# Patient Record
Sex: MALE | Race: WHITE | NOT HISPANIC OR LATINO | Employment: OTHER | URBAN - METROPOLITAN AREA
[De-identification: names, ages, dates, MRNs, and addresses within clinical notes are randomized per-mention and may not be internally consistent; named-entity substitution may affect disease eponyms.]

---

## 2017-03-07 ENCOUNTER — OFFICE VISIT (OUTPATIENT)
Dept: NEUROLOGY | Facility: CLINIC | Age: 67
End: 2017-03-07
Payer: MEDICARE

## 2017-03-07 VITALS
BODY MASS INDEX: 31.94 KG/M2 | HEIGHT: 67 IN | WEIGHT: 203.5 LBS | HEART RATE: 76 BPM | SYSTOLIC BLOOD PRESSURE: 148 MMHG | DIASTOLIC BLOOD PRESSURE: 79 MMHG

## 2017-03-07 DIAGNOSIS — G20.A1 PARKINSON DISEASE: Primary | ICD-10-CM

## 2017-03-07 DIAGNOSIS — E55.9 VITAMIN D DEFICIENCY: ICD-10-CM

## 2017-03-07 DIAGNOSIS — G47.9 SLEEP DISORDER: ICD-10-CM

## 2017-03-07 DIAGNOSIS — R25.1 TREMOR: ICD-10-CM

## 2017-03-07 PROCEDURE — 99214 OFFICE O/P EST MOD 30 MIN: CPT | Mod: S$PBB,,, | Performed by: PSYCHIATRY & NEUROLOGY

## 2017-03-07 PROCEDURE — 99999 PR PBB SHADOW E&M-EST. PATIENT-LVL III: CPT | Mod: PBBFAC,,, | Performed by: PSYCHIATRY & NEUROLOGY

## 2017-03-07 PROCEDURE — 99213 OFFICE O/P EST LOW 20 MIN: CPT | Mod: PBBFAC | Performed by: PSYCHIATRY & NEUROLOGY

## 2017-03-07 RX ORDER — RASAGILINE 1 MG/1
1 TABLET ORAL DAILY
Qty: 90 TABLET | Refills: 4 | Status: SHIPPED | OUTPATIENT
Start: 2017-03-07 | End: 2017-09-12

## 2017-03-07 NOTE — MR AVS SNAPSHOT
[facility]  [address]  [facility] [address]  Phone: [phone]  Fax: [phone]                  Luiz Mar   3/7/2017 2:20 PM   Office Visit    Description:  Male : 1950   Provider:  Tina Jaramillo MD   Department:  [facility]           Diagnoses this Visit        Comments    Tremor    -  Primary     Sleep disorder         Vitamin D deficiency                To Do List           Goals (5 Years of Data)     None      Follow-Up and Disposition     Return in about 3 months (around 2017).       These Medications        Disp Refills Start End    rasagiline (AZILECT) 1 mg Tab 90 tablet 4 3/7/2017 3/7/2018    Take 1 tablet (1 mg total) by mouth once daily. - Oral    Pharmacy: [facility] [address] #: [phone]         [facility]     [facility] On Call Nurse Care Line -  Assistance  Registered nurses in the [facility] On Call Center provide clinical advisement, health education, appointment booking, and other advisory services.  Call for this free service at [phone].             Medications           Message regarding Medications     Verify the changes and/or additions to your medication regime listed below are the same as discussed with your clinician today.  If any of these changes or additions are incorrect, please notify your healthcare provider.        START taking these NEW medications        Refills    rasagiline (AZILECT) 1 mg Tab 4    Sig: Take 1 tablet (1 mg total) by mouth once daily.    Class: Normal    Route: Oral           Verify that the below list of medications is an accurate representation of the medications you are currently taking.  If none reported, the list may be blank. If incorrect, please contact your healthcare provider. Carry this list with you in case of emergency.           Current Medications     aspirin (ECOTRIN) 81 MG EC tablet Take 81 mg by mouth once daily.     "BENICAR 20 mg tablet     rasagiline (AZILECT) 1 mg Tab Take 1 tablet (1 mg total) by mouth once daily.           Clinical Reference Information           Your Vitals Were     BP Pulse Height Weight BMI    148/79 76 5' 7" (1.702 m) 92.3 kg (203 lb 7.8 oz) 31.87 kg/m2      Blood Pressure          Most Recent Value    BP  (!)  148/79      Allergies as of 3/7/2017     No Known Allergies      Immunizations Administered on Date of Encounter - 3/7/2017     None      [facility] Sign-Up     Activating your [facility] account is as easy as 1-2-3!     1) Visit [email or web address], select Sign Up Now, enter this activation code and your date of birth, then select Next.  [email or web address]  Expires: 4/21/2017  3:06 PM      2) Create a username and password to use when you visit [facility] in the future and select a security question in case you lose your password and select Next.    3) Enter your e-mail address and click Sign Up!    Additional Information  If you have questions, please e-mail [email or web address] or call [phone] to talk to our [facility] staff. Remember, [facility] is NOT to be used for urgent needs. For medical emergencies, dial 911.         Instructions        Lab Visit on 11/29/2016   Component Date Value Ref Range Status    PTH, Intact 11/29/2016 86.0* 9.0 - 77.0 pg/mL Final    Calcium 11/29/2016 9.4  8.7 - 10.5 mg/dL Final    Thiamine 11/29/2016 41  38 - 122 ug/L Final    Comment: This test was developed and the performance   characteristics determined by [facility].   It has not been cleared or approved by the FDA.   The laboratory is regulated under CLIA as qualified to   perform high-complexity testing. This test is used for   patient testing purposes. It should not be regarded   as investigational or for research.  Test performed at [facility],  [address], [place]  [address]     [phone]  Fadi Esposito MD  - Medical Director      Vit D, 25-Hydroxy 11/29/2016 " 26* 30 - 96 ng/mL Final    Comment: Vitamin D deficiency.........<10 ng/mL                              Vitamin D insufficiency......10-29 ng/mL       Vitamin D sufficiency........> or equal to 30 ng/mL  Vitamin D toxicity............>100 ng/mL              Language Assistance Services     ATTENTION: Language assistance services are available, free of charge. Please call [phone].      ATENCIÓN: Si misti johansen, tiene a eng disposición servicios gratuitos de asistencia lingüística. Llame al [phone].     [facility] Ý: N?u b?n nói Ti?ng Vi?t, có các d?ch v? h? tr? ngôn ng? mi?n phí dành cho b?n. G?i s? [phone].         [facility] complies with applicable Federal civil rights laws and does not discriminate on the basis of race, color, national origin, age, disability, or sex.

## 2017-03-07 NOTE — ASSESSMENT & PLAN NOTE
Mild progression in tremor in past 3 months, worsening dexterity of right hand.     -> start azilect   -> OT

## 2017-03-07 NOTE — PATIENT INSTRUCTIONS
Lab Visit on 11/29/2016   Component Date Value Ref Range Status    PTH, Intact 11/29/2016 86.0* 9.0 - 77.0 pg/mL Final    Calcium 11/29/2016 9.4  8.7 - 10.5 mg/dL Final    Thiamine 11/29/2016 41  38 - 122 ug/L Final    Comment: This test was developed and the performance   characteristics determined by [facility].   It has not been cleared or approved by the FDA.   The laboratory is regulated under CLIA as qualified to   perform high-complexity testing. This test is used for   patient testing purposes. It should not be regarded   as investigational or for research.  Test performed at [facility],  [address] [place]  [address]     [phone]  Fadi Esposito MD  - Medical Director      Vit D, 25-Hydroxy 11/29/2016 26* 30 - 96 ng/mL Final    Comment: Vitamin D deficiency.........<10 ng/mL                              Vitamin D insufficiency......10-29 ng/mL       Vitamin D sufficiency........> or equal to 30 ng/mL  Vitamin D toxicity............>100 ng/mL

## 2017-03-07 NOTE — PROGRESS NOTES
"Luiz BRICENO Chief Complaints during this visit:  f/u Patient visit for  tremors      Primary Care Physician  Elias Westfall MD  [address]  [facility] [address]      History of present illness:   66 y.o.  male seen in f/u for PD.  Accompanied by wife.  Tremor is more obvious at times since last visit.  Active dreaming is less often.      Interval history 11/29/16:  No worsening since last seen.  Balance is still "ok."  Still dreaming actively about 2x/week.  Wife says he does hit at times.      Interval history 8/31/16:  ...consultation at the request of  Dr. Sharma (friend of daughter) for evaluation of tremor.  Accompanied wife and daughter.  Right hand tremor for about 6 months.  Bystolic started last fall.  Not long after (couple months), he started fighting in sleep.  Still talks, but not nearly as violent.  Notices more often when walking.  No problems with gait, balance.  Sense of smell is poor per wife.  Wife says that he asks questions more often.    Retired oil-rig contractor.  2015.  Paternal aunt penny had seizures and head tremor.   No etoh use.      II.  Review of systems  As in HPI,  otherwise, balance 3 systems reviewed and are negative.    III.  Past Medical History:   Diagnosis Date    Heart disease     right bundle brach block    Memory loss 8/31/2016    Movement disorder     tremors (in hand)     No family history on file.  Social History     Social History    Marital status:      Spouse name: N/A    Number of children: N/A    Years of education: N/A     Social History Main Topics    Smoking status: Never Smoker    Smokeless tobacco: None    Alcohol use No    Drug use: No    Sexual activity: Yes     Partners: Female     Other Topics Concern    None     Social History Narrative         Current Outpatient Prescriptions on File Prior to Visit   Medication Sig Dispense Refill    aspirin (ECOTRIN) 81 MG EC tablet Take 81 mg by mouth once daily.      BENICAR 20 mg tablet    " "    No current facility-administered medications on file prior to visit.        PRIOR problem-specific medications tried:  na    Review of patient's allergies indicates:  No Known Allergies    IV. Physical Exam    Vitals:    03/07/17 1428   BP: (!) 148/79   Pulse: 76   Weight: 92.3 kg (203 lb 7.8 oz)   Height: 5' 7" (1.702 m)     General appearance: Well nourished, well developed, no acute distress.          -------------------------------------------------------------  Facial Expression: 2: Mild: In addition to decreased eye-blink frequency, Masked facies present in the lower  face as well, namely fewer movements around the mouth, such as less  spontaneous smiling, but lips not parted.      Affect: full       Orientation to time & place:  Oriented to time, place, person and situation       Speech:  normal (not dysarthric)  -------------------------------------------------------  Cranial nerves:  extraocular movements intact,         -------------------------------------------------------  Cerebellar and Coordination  Gait:  normal, resting tremor on right       Finger-nose: no dysmetria       Rapid Alternating Movements (pronation/supination):  R 2: Mild: Any of the following: a) 3 to 5 interruptions during movements; b) mild slowing; c) the amplitude decrements midway in the sequence. ; L normal  --------------------------------------------------------------  MOVEMENT DISORDERS FOCUSED EXAM  Abnormality of movement (bradykinesia, hyperkinesia) present? Yes, 1: Slight: Slight global slowness and poverty of spontaneous movements.   Tremor present?   Yes, 1: Slight: Tremor is present but less than 1 cm in amplitude. 1: Slight: Tremor at rest is present < 25% of the entire examination period.   Posture:  normal  Postural stability:  no Rhomberg    V.  Laboratory/ Radiological Data:     No visits with results within 2 Month(s) from this visit.  Latest known visit with results is:    Lab Visit on 11/29/2016   Component " Date Value Ref Range Status    PTH, Intact 11/29/2016 86.0* 9.0 - 77.0 pg/mL Final    Calcium 11/29/2016 9.4  8.7 - 10.5 mg/dL Final    Thiamine 11/29/2016 41  38 - 122 ug/L Final    Comment: This test was developed and the performance   characteristics determined by [facility].   It has not been cleared or approved by the FDA.   The laboratory is regulated under CLIA as qualified to   perform high-complexity testing. This test is used for   patient testing purposes. It should not be regarded   as investigational or for research.  Test performed at [facility],  [address], [place]  [address]     [phone]  Fadi Esposito MD  - Medical Director      Vit D, 25-Hydroxy 11/29/2016 26* 30 - 96 ng/mL Final    Comment: Vitamin D deficiency.........<10 ng/mL                              Vitamin D insufficiency......10-29 ng/mL       Vitamin D sufficiency........> or equal to 30 ng/mL  Vitamin D toxicity............>100 ng/mL           Outpatient labs:  Wbc unremarkable; CMP unremarkable except mildly elevated glucose.  TSH 1.170    Vitamin D, thiamine, PTH not drawn      9/8/16 MRI brain personally reviewed and found it normal.                     VI. Assessment and Plan:    Problem List Items Addressed This Visit     Vitamin D deficiency    Overview     11/29/16:  Vit D 26L         Current Assessment & Plan     Start multivitamin         Parkinson disease - Primary    Overview     Right hand resting tremor, mild bradykinesia, RBD         Current Assessment & Plan     Mild progression in tremor in past 3 months, worsening dexterity of right hand.     -> start azilect   -> OT         Relevant Medications    rasagiline (AZILECT) 1 mg Tab    Sleep disorder    Overview     Active dreaming           Current Assessment & Plan     Improved, less active dreaming events.   -> continue melatonin                                          Return in about 3 months (around 6/7/2017).  (me or  Melissa Altamirano

## 2017-03-07 NOTE — LETTER
March 7, 2017      Elias Westfall MD  [address]  [place] [address]           [facility]  [address]  [facility] [address]  Phone: [phone]  Fax: [phone]          Patient: Luiz Mar   MR Number: [id number]   YOB: 1950   Date of Visit: 3/7/2017       Dear Dr. Elias Westfall:    Thank you for referring Luiz Mar to me for evaluation. Attached you will find relevant portions of my assessment and plan of care.    If you have questions, please do not hesitate to call me. I look forward to following Luiz Mar along with you.    Sincerely,    Tina Jaramillo MD    Enclosure  CC:  No Recipients    If you would like to receive this communication electronically, please contact [email or web address] or [phone] to request more information on [facility] access.    For providers and/or their staff who would like to refer a patient to [facility], please contact us through our one-stop-shop provider referral line, [facility], at [phone].    If you feel you have received this communication in error or would no longer like to receive these types of communications, please e-mail [email or web address]

## 2017-09-12 ENCOUNTER — TELEPHONE (OUTPATIENT)
Dept: NEUROLOGY | Facility: CLINIC | Age: 67
End: 2017-09-12

## 2017-09-12 RX ORDER — SELEGILINE HYDROCHLORIDE 5 MG/1
5 TABLET ORAL 2 TIMES DAILY WITH MEALS
Qty: 60 TABLET | Refills: 11 | Status: SHIPPED | OUTPATIENT
Start: 2017-09-12 | End: 2018-08-15 | Stop reason: SDUPTHER

## 2017-09-12 NOTE — TELEPHONE ENCOUNTER
----- Message from Jose Duran sent at 9/12/2017 11:19 AM CDT -----  Contact: Erin ( spouse ) [phone] or [phone]  Caller is requesting a return call regarding a alternative to the medication (rasagiline (AZILECT) 1 mg Tab ) that is less expensive, pls call

## 2017-09-15 ENCOUNTER — TELEPHONE (OUTPATIENT)
Dept: NEUROLOGY | Facility: CLINIC | Age: 67
End: 2017-09-15

## 2017-09-15 NOTE — TELEPHONE ENCOUNTER
----- Message from Alex Tracey sent at 9/15/2017 11:30 AM CDT -----  Contact: Erin (wife) @ home: [phone] or cell: [phone]  Erin states she called before to speak with someone about get another medication other than azilect, because the azilect is too expensive. Erin is requesting someone contact her back asap.

## 2017-09-15 NOTE — TELEPHONE ENCOUNTER
Ms. Erin informed that a new script for selegiline had been sent by Dr. Jaramillo on Tuesday(9/12). Script was filled yesterday per pharm rep. It is available for pick-up now.

## 2018-03-27 ENCOUNTER — OFFICE VISIT (OUTPATIENT)
Dept: NEUROLOGY | Facility: CLINIC | Age: 68
End: 2018-03-27
Payer: MEDICARE

## 2018-03-27 VITALS
DIASTOLIC BLOOD PRESSURE: 88 MMHG | BODY MASS INDEX: 31.18 KG/M2 | HEIGHT: 67 IN | HEART RATE: 78 BPM | WEIGHT: 198.63 LBS | SYSTOLIC BLOOD PRESSURE: 158 MMHG

## 2018-03-27 DIAGNOSIS — R41.89 SUBJECTIVE MEMORY COMPLAINTS: ICD-10-CM

## 2018-03-27 DIAGNOSIS — G25.9 EXTRAPYRAMIDAL AND MOVEMENT DISORDER: ICD-10-CM

## 2018-03-27 DIAGNOSIS — G20.A1 PARKINSON DISEASE: Primary | ICD-10-CM

## 2018-03-27 PROCEDURE — 99999 PR PBB SHADOW E&M-EST. PATIENT-LVL III: CPT | Mod: PBBFAC,,, | Performed by: PSYCHIATRY & NEUROLOGY

## 2018-03-27 PROCEDURE — 99214 OFFICE O/P EST MOD 30 MIN: CPT | Mod: S$PBB,,, | Performed by: PSYCHIATRY & NEUROLOGY

## 2018-03-27 PROCEDURE — 99213 OFFICE O/P EST LOW 20 MIN: CPT | Mod: PBBFAC | Performed by: PSYCHIATRY & NEUROLOGY

## 2018-03-27 RX ORDER — AMOXICILLIN 500 MG
CAPSULE ORAL DAILY
COMMUNITY

## 2018-03-27 RX ORDER — SAW PALMETTO 160 MG
160 CAPSULE ORAL 2 TIMES DAILY
COMMUNITY

## 2018-03-27 RX ORDER — THIAMINE HCL 50 MG
50 TABLET ORAL DAILY
COMMUNITY

## 2018-03-27 RX ORDER — ERGOCALCIFEROL 1.25 MG/1
50000 CAPSULE ORAL
COMMUNITY

## 2018-03-27 NOTE — ASSESSMENT & PLAN NOTE
Mild, expected progression since last year.  Balance is becoming effected, but still mild.   -> PT   -> no new meds

## 2018-03-27 NOTE — ASSESSMENT & PLAN NOTE
Passed MOCA today without problems.  Will recheck his thiamine (was low end of normal 2 years ago), b12 and tsh for reversible causes.   -> labs today

## 2018-03-27 NOTE — PROGRESS NOTES
"Luiz BRICENO Chief Complaints during this visit:  f/u Patient visit for PD    Primary Care Physician  Elias Westfall MD  [address]  [facility] [address]      History of present illness:   67 y.o.  M seen in f/u for PD.  Right -handed, travels from [facility].  Accompanied by daughter, Miracle.  Tremors are more pronounced in right hand.  Walk is less steady, trips, no falls.    Memory is getting worse.  Stays busy working in yard, training his dogs.  Hobbies- hunting ducks, deer, dove  Rare active dreaming.      Interval history 3/7/17:  Tremor is more obvious at times since last visit.  Active dreaming is less often.    Interval history 11/29/16:  No worsening since last seen.  Balance is still "ok."  Still dreaming actively about 2x/week.  Wife says he does hit at times.    Interval history 8/31/16:  ...consultation at the request of  Dr. Sharma (friend of daughter) for evaluation of tremor.  Accompanied wife and daughter.  Right hand tremor for about 6 months.  Bystolic started last fall.  Not long after (couple months), he started fighting in sleep.  Still talks, but not nearly as violent.  Notices more often when walking.  No problems with gait, balance.  Sense of smell is poor per wife.  Wife says that he asks questions more often.    Retired oil-rig contractor.  2015.  Paternal aunt penny had seizures and head tremor.   No etoh use.      II.  Review of systems  As in HPI,  otherwise, balance 3 systems reviewed and are negative.    III.  Past Medical History:   Diagnosis Date    Heart disease     right bundle brach block    Memory loss 8/31/2016    Movement disorder     tremors (in hand)     No family history on file.  Social History     Social History    Marital status:      Spouse name: N/A    Number of children: N/A    Years of education: N/A     Social History Main Topics    Smoking status: Never Smoker    Smokeless tobacco: None    Alcohol use No    Drug use: No    Sexual " "activity: Yes     Partners: Female     Other Topics Concern    None     Social History Narrative    None         Current Outpatient Prescriptions on File Prior to Visit   Medication Sig Dispense Refill    aspirin (ECOTRIN) 81 MG EC tablet Take 81 mg by mouth once daily.      BENICAR 20 mg tablet       selegiline HCl (ELDEPRYL) 5 mg tablet Take 1 tablet (5 mg total) by mouth 2 (two) times daily with meals. REPLACES AZILECT, DUE TO COST. 60 tablet 11     No current facility-administered medications on file prior to visit.        PRIOR problem-specific medications tried:  na    Review of patient's allergies indicates:  No Known Allergies    IV. Physical Exam    Vitals:    03/27/18 0843   BP: (!) 158/88   Pulse: 78   Weight: 90.1 kg (198 lb 10.2 oz)   Height: 5' 7" (1.702 m)     General appearance: Well nourished, well developed, no acute distress.         Cardiovascular:  pedal pulses 2, no edema or cyanosis, heart regular rate and rhythym, no carotid bruits.         -------------------------------------------------------------   Affect: full       Orientation to time & place:  Oriented to time, place, person and situation       Attention & concentration:  Normal attention span and concentration       Memory:  Recent and remote memory intact  Zheng Cognitive Assessment:   28/30  Language: Spontaneous, fluent; able to repeat and name objects        Fund of knowledge:  Aware of current events            Unified Parkinson's Disease Rating Scale (motor part only)      UPDRS Motor Examination      Speech  1 - Slight loss of expression, dictation, and/or volumn.   Facial Expression  1 - Minimal Hypomimia, could be normal "Poker Face".   Rigidity     Neck 0 - Absent.   Upper Extremity: Right 2 - Mild or moderate.   Upper Extremity: Left 0 - Absent.   Lower Extremity: Right 0 - Absent.   Lower Extremity: Left 0 - Absent.     Finger Taps      right 1 - Mild slowing and/or reduction in amplitude.   left 0 - Normal. "   Hand Movements      right 2 - Moderately impaired. Definite and early fatiguing. May have occasional arrests in movement.   left 0 - Normal.   Pronation/supination of Hands      right 2 - Moderately impaired. Definite and early fatiguing. May have occasional arrests in movement.   left 0 - Normal.     Toe Tapping    right 0 - Normal.   left 0 - Normal.     Leg Agility      right 0 - Normal.   left 0 - Normal.   Arising from Chair  0 - Normal.   Posture  0 - Normal erect.   Gait  0 - Normal, but dec armswing on right   Freezing of gait 0: Normal: No freezing.    Postural Stability (Response to sudden, strong posterior displacement produced by pull on shoulders while patient erect with eyes open and feet slightly apart.   0 - Normal.   Body Bradykinesia and Hypokinesia (Combining slowness, hesitancy, decreased armswing, small amplitude, and poverty of movement in general)  2 - Mild degree of slowness and poverty of movement which is definitely abnormal.     Tremor at Rest:      Face, lips, chin 0 - Absent.    Hands:      right 2 - Mild in amplitude and persistent. Or moderate in amplitude, but only intermittently present.    left 0 - Absent.    Feet:     right 0 - Absent.    left 0 - Absent.    Constancy of REST tremor: 1: Slight: Tremor at rest is present < 25% of the entire examination period.  (only when walking)   Postural tremor:    right 0 - Absent.    left 0 - Absent.    Kinetic tremor:    right 0 - Absent.    left 0 - Absent.    Dyskinesias present? No       Total Motor UPDRS:  14      V.  Laboratory/ Radiological Data:     No visits with results within 2 Month(s) from this visit.   Latest known visit with results is:   Lab Visit on 11/29/2016   Component Date Value Ref Range Status    PTH, Intact 11/29/2016 86.0* 9.0 - 77.0 pg/mL Final    Calcium 11/29/2016 9.4  8.7 - 10.5 mg/dL Final    Thiamine 11/29/2016 41  38 - 122 ug/L Final    Comment: This test was developed and the performance   characteristics  determined by [facility].   It has not been cleared or approved by the FDA.   The laboratory is regulated under CLIA as qualified to   perform high-complexity testing. This test is used for   patient testing purposes. It should not be regarded   as investigational or for research.  Test performed at [facility],  [address], [place]  [address]     [phone]  Fadi Esposito MD  - Medical Director      Vit D, 25-Hydroxy 11/29/2016 26* 30 - 96 ng/mL Final    Comment: Vitamin D deficiency.........<10 ng/mL                              Vitamin D insufficiency......10-29 ng/mL       Vitamin D sufficiency........> or equal to 30 ng/mL  Vitamin D toxicity............>100 ng/mL           Outpatient labs:  Wbc unremarkable; CMP unremarkable except mildly elevated glucose.  TSH 1.170    Vitamin D, thiamine, PTH not drawn      9/8/16 MRI brain personally reviewed and found it normal.                     VI. Assessment and Plan:    Problem List Items Addressed This Visit        1 - High    Parkinson disease - Primary    Overview     Right hand resting tremor, mild bradykinesia, RBD         Current Assessment & Plan     Mild, expected progression since last year.  Balance is becoming effected, but still mild.   -> PT   -> no new meds         Relevant Orders    Ambulatory Referral to Physical/Occupational Therapy       2     Subjective memory complaints    Overview     Forgets names.  3/2018 Bentley Cognitive Assessment:   28/30         Current Assessment & Plan     Passed MOCA today without problems.  Will recheck his thiamine (was low end of normal 2 years ago), b12 and tsh for reversible causes.   -> labs today         Relevant Orders    TSH    Vitamin B12    Vitamin B1    Comprehensive metabolic panel    CBC auto differential      Other Visit Diagnoses     Extrapyramidal and movement disorder         Relevant Orders    TSH    Vitamin B12                                     Follow-up  in about 3 months (around 6/27/2018) for PD.

## 2018-08-15 ENCOUNTER — OFFICE VISIT (OUTPATIENT)
Dept: NEUROLOGY | Facility: CLINIC | Age: 68
End: 2018-08-15
Payer: MEDICARE

## 2018-08-15 VITALS
BODY MASS INDEX: 32.8 KG/M2 | HEIGHT: 67 IN | SYSTOLIC BLOOD PRESSURE: 137 MMHG | HEART RATE: 80 BPM | DIASTOLIC BLOOD PRESSURE: 83 MMHG | WEIGHT: 209 LBS

## 2018-08-15 DIAGNOSIS — G20.A1 PARKINSON DISEASE: Primary | ICD-10-CM

## 2018-08-15 PROCEDURE — 99213 OFFICE O/P EST LOW 20 MIN: CPT | Mod: S$PBB,,, | Performed by: PSYCHIATRY & NEUROLOGY

## 2018-08-15 PROCEDURE — 99999 PR PBB SHADOW E&M-EST. PATIENT-LVL III: CPT | Mod: PBBFAC,,, | Performed by: PSYCHIATRY & NEUROLOGY

## 2018-08-15 PROCEDURE — 99213 OFFICE O/P EST LOW 20 MIN: CPT | Mod: PBBFAC | Performed by: PSYCHIATRY & NEUROLOGY

## 2018-08-15 RX ORDER — SELEGILINE HYDROCHLORIDE 5 MG/1
5 TABLET ORAL 2 TIMES DAILY WITH MEALS
Qty: 60 TABLET | Refills: 11 | Status: SHIPPED | OUTPATIENT
Start: 2018-08-15 | End: 2019-05-22 | Stop reason: SDUPTHER

## 2018-08-15 NOTE — PROGRESS NOTES
"Luiz BRICENO Chief Complaints during this visit:  f/u Patient visit for PD    Primary Care Physician  Elias Westfall MD  [address]  [facility] [address]      History of present illness:   67 y.o.  M seen in f/u for PD.  Right -handed, travels from [facility].  Says theres is a little more tremor in right hand, worse with stress.  Memory about the same.  Less dreaming.    PT was beneficial.  No falls, but trips often.      Interval history 3/27/18:  Accompanied by daughter, Miracle.  Tremors are more pronounced in right hand.  Walk is less steady, trips, no falls.  Memory is getting worse.  Stays busy working in yard, training his dogs.  Hobbies- hunting ducks, deer, dove  Rare active dreaming.    Interval history 3/7/17:  Tremor is more obvious at times since last visit.  Active dreaming is less often.    Interval history 11/29/16:  No worsening since last seen.  Balance is still "ok."  Still dreaming actively about 2x/week.  Wife says he does hit at times.    Interval history 8/31/16:  ...consultation at the request of  Dr. Sharma (friend of daughter) for evaluation of tremor.  Accompanied wife and daughter.  Right hand tremor for about 6 months.  Bystolic started last fall.  Not long after (couple months), he started fighting in sleep.  Still talks, but not nearly as violent.  Notices more often when walking.  No problems with gait, balance.  Sense of smell is poor per wife.  Wife says that he asks questions more often.    Retired oil-rig contractor.  2015.  Paternal aunt penny had seizures and head tremor.   No etoh use.      II.  Review of systems  As in HPI,  otherwise, balance 3 systems reviewed and are negative.    III.  Past Medical History:   Diagnosis Date    Heart disease     right bundle brach block    Memory loss 8/31/2016    Parkinson disease 8/31/2016    Right hand resting tremor, mild bradykinesia, RBD     History reviewed. No pertinent family history.  Social History     Socioeconomic " "History    Marital status:      Spouse name: None    Number of children: None    Years of education: None    Highest education level: None   Social Needs    Financial resource strain: None    Food insecurity - worry: None    Food insecurity - inability: None    Transportation needs - medical: None    Transportation needs - non-medical: None   Occupational History    None   Tobacco Use    Smoking status: Never Smoker   Substance and Sexual Activity    Alcohol use: No    Drug use: No    Sexual activity: Yes     Partners: Female   Other Topics Concern    None   Social History Narrative    None         Current Outpatient Medications on File Prior to Visit   Medication Sig Dispense Refill    aspirin (ECOTRIN) 81 MG EC tablet Take 81 mg by mouth once daily.      BENICAR 20 mg tablet       ergocalciferol (VITAMIN D2) 50,000 unit Cap Take 50,000 Units by mouth every 7 days.      fish oil-omega-3 fatty acids 300-1,000 mg capsule Take by mouth once daily.      saw palmetto 160 MG capsule Take 160 mg by mouth 2 (two) times daily.      thiamine (VITAMIN B-1) 50 MG tablet Take 50 mg by mouth once daily.      [DISCONTINUED] selegiline HCl (ELDEPRYL) 5 mg tablet Take 1 tablet (5 mg total) by mouth 2 (two) times daily with meals. REPLACES AZILECT, DUE TO COST. 60 tablet 11     No current facility-administered medications on file prior to visit.        PRIOR problem-specific medications tried:  na    Review of patient's allergies indicates:  No Known Allergies    IV. Physical Exam    Vitals:    08/15/18 1118   BP: 137/83   Pulse: 80   Weight: 94.8 kg (209 lb)   Height: 5' 7" (1.702 m)     General appearance: Well nourished, well developed, no acute distress.         Cardiovascular:  pedal pulses 2, no edema or cyanosis, heart regular rate and rhythym, no carotid bruits.         -------------------------------------------------------------   Affect: full       Orientation to time & place:  Oriented to " "time, place, person and situation       Attention & concentration:  Normal attention span and concentration       Memory:  Recent and remote memory intact  Arp Cognitive Assessment:   28/30  Language: Spontaneous, fluent; able to repeat and name objects        Fund of knowledge:  Aware of current events            Unified Parkinson's Disease Rating Scale (motor part only)      UPDRS Motor Examination      Speech  1 - Slight loss of expression, dictation, and/or volumn.   Facial Expression  1 - Minimal Hypomimia, could be normal "Poker Face".   Rigidity     Neck 0 - Absent.   Upper Extremity: Right 2 - Mild or moderate.   Upper Extremity: Left 0 - Absent.   Lower Extremity: Right 0 - Absent.   Lower Extremity: Left 0 - Absent.     Finger Taps      right 1 - Mild slowing and/or reduction in amplitude.   left 0 - Normal.   Hand Movements      right 2 - Moderately impaired. Definite and early fatiguing. May have occasional arrests in movement.   left 0 - Normal.   Pronation/supination of Hands      right 2 - Moderately impaired. Definite and early fatiguing. May have occasional arrests in movement.   left 0 - Normal.     Toe Tapping    right 0 - Normal.   left 0 - Normal.     Leg Agility      right 0 - Normal.   left 0 - Normal.   Arising from Chair  0 - Normal.   Posture  0 - Normal erect.   Gait  0 - Normal, but dec armswing on right   Freezing of gait 0: Normal: No freezing.    Postural Stability (Response to sudden, strong posterior displacement produced by pull on shoulders while patient erect with eyes open and feet slightly apart.   0 - Normal.   Body Bradykinesia and Hypokinesia (Combining slowness, hesitancy, decreased armswing, small amplitude, and poverty of movement in general)  2 - Mild degree of slowness and poverty of movement which is definitely abnormal.     Tremor at Rest:      Face, lips, chin 0 - Absent.    Hands:      right 2 - Mild in amplitude and persistent. Or moderate in amplitude, but " only intermittently present.    left 0 - Absent.    Feet:     right 0 - Absent.    left 0 - Absent.    Constancy of REST tremor: 1: Slight: Tremor at rest is present < 25% of the entire examination period.  (only when walking)   Postural tremor:    right 0 - Absent.    left 0 - Absent.    Kinetic tremor:    right 0 - Absent.    left 0 - Absent.    Dyskinesias present? No       Total Motor UPDRS:  14      V.  Laboratory/ Radiological Data:     Lab Visit on 03/27/2018   Component Date Value Ref Range Status    TSH 03/27/2018 1.805  0.400 - 4.000 uIU/mL Final    Vitamin B-12 03/27/2018 625  210 - 950 pg/mL Final    Thiamine 03/27/2018 102  38 - 122 ug/L Final    Sodium 03/27/2018 141  136 - 145 mmol/L Final    Potassium 03/27/2018 4.0  3.5 - 5.1 mmol/L Final    Chloride 03/27/2018 103  95 - 110 mmol/L Final    CO2 03/27/2018 29  23 - 29 mmol/L Final    Glucose 03/27/2018 129* 70 - 110 mg/dL Final    BUN, Bld 03/27/2018 19  8 - 23 mg/dL Final    Creatinine 03/27/2018 1.3  0.5 - 1.4 mg/dL Final    Calcium 03/27/2018 10.1  8.7 - 10.5 mg/dL Final    Total Protein 03/27/2018 7.5  6.0 - 8.4 g/dL Final    Albumin 03/27/2018 4.4  3.5 - 5.2 g/dL Final    Total Bilirubin 03/27/2018 0.8  0.1 - 1.0 mg/dL Final    Alkaline Phosphatase 03/27/2018 67  55 - 135 U/L Final    AST 03/27/2018 18  10 - 40 U/L Final    ALT 03/27/2018 18  10 - 44 U/L Final    Anion Gap 03/27/2018 9  8 - 16 mmol/L Final    eGFR if African American 03/27/2018 >60.0  >60 mL/min/1.73 m^2 Final    eGFR if non African American 03/27/2018 56.1* >60 mL/min/1.73 m^2 Final    WBC 03/27/2018 6.86  3.90 - 12.70 K/uL Final    RBC 03/27/2018 5.37  4.60 - 6.20 M/uL Final    Hemoglobin 03/27/2018 14.1  14.0 - 18.0 g/dL Final    Hematocrit 03/27/2018 45.8  40.0 - 54.0 % Final    MCV 03/27/2018 85  82 - 98 fL Final    MCH 03/27/2018 26.3* 27.0 - 31.0 pg Final    MCHC 03/27/2018 30.8* 32.0 - 36.0 g/dL Final    RDW 03/27/2018 13.7  11.5 - 14.5 %  Final    Platelets 03/27/2018 269  150 - 350 K/uL Final    MPV 03/27/2018 9.0* 9.2 - 12.9 fL Final    Immature Granulocytes 03/27/2018 0.6* 0.0 - 0.5 % Final    Gran # (ANC) 03/27/2018 4.6  1.8 - 7.7 K/uL Final    Immature Grans (Abs) 03/27/2018 0.04  0.00 - 0.04 K/uL Final         Outpatient labs:  Wbc unremarkable; CMP unremarkable except mildly elevated glucose.  TSH 1.170    9/8/16 MRI brain personally reviewed and found it normal.                     VI. Assessment and Plan:    Problem List Items Addressed This Visit        1 - High    Parkinson disease - Primary    Overview     Right hand resting tremor, mild bradykinesia, RBD         Current Assessment & Plan     Mild, expected progression in tremor since last year, but still minimally bothersome.  Balance minimally affected.   -> continue PT   -> no new meds, but expect he may need them in next 6 months          Relevant Medications    selegiline HCl (ELDEPRYL) 5 mg tablet                                     Follow-up in about 3 months (around 11/15/2018).

## 2018-08-15 NOTE — LETTER
August 15, 2018      Elias Westfall MD  [address]  [place] [address]           [facility]  [address]  [facility] [address]  Phone: [phone]  Fax: [phone]          Patient: Luiz Mar   MR Number: [id number]   YOB: 1950   Date of Visit: 8/15/2018       Dear Dr. Elias Westfall:    Thank you for referring Luiz Mar to me for evaluation. Attached you will find relevant portions of my assessment and plan of care.    If you have questions, please do not hesitate to call me. I look forward to following Luiz Mar along with you.    Sincerely,    Tina Jaramillo MD    Enclosure  CC:  No Recipients    If you would like to receive this communication electronically, please contact [email or web address] or [phone] to request more information on [employer] Link access.    For providers and/or their staff who would like to refer a patient to [facility], please contact us through our one-stop-shop provider referral line, [facility], at [phone].    If you feel you have received this communication in error or would no longer like to receive these types of communications, please e-mail [email or web address]

## 2018-08-15 NOTE — ASSESSMENT & PLAN NOTE
Mild, expected progression in tremor since last year, but still minimally bothersome.  Balance minimally affected.   -> continue PT   -> no new meds, but expect he may need them in next 6 months

## 2018-11-16 ENCOUNTER — OFFICE VISIT (OUTPATIENT)
Dept: NEUROLOGY | Facility: CLINIC | Age: 68
End: 2018-11-16
Payer: MEDICARE

## 2018-11-16 VITALS
BODY MASS INDEX: 31.28 KG/M2 | HEART RATE: 59 BPM | DIASTOLIC BLOOD PRESSURE: 80 MMHG | WEIGHT: 199.31 LBS | HEIGHT: 67 IN | SYSTOLIC BLOOD PRESSURE: 160 MMHG

## 2018-11-16 DIAGNOSIS — G20.A1 PARKINSON'S DISEASE: Primary | ICD-10-CM

## 2018-11-16 DIAGNOSIS — G47.9 SLEEP DISORDER: ICD-10-CM

## 2018-11-16 DIAGNOSIS — R41.89 SUBJECTIVE MEMORY COMPLAINTS: ICD-10-CM

## 2018-11-16 PROCEDURE — 99213 OFFICE O/P EST LOW 20 MIN: CPT | Mod: PBBFAC | Performed by: PSYCHIATRY & NEUROLOGY

## 2018-11-16 PROCEDURE — 99214 OFFICE O/P EST MOD 30 MIN: CPT | Mod: S$PBB,,, | Performed by: PSYCHIATRY & NEUROLOGY

## 2018-11-16 PROCEDURE — 99999 PR PBB SHADOW E&M-EST. PATIENT-LVL III: CPT | Mod: PBBFAC,,, | Performed by: PSYCHIATRY & NEUROLOGY

## 2018-11-16 NOTE — ASSESSMENT & PLAN NOTE
Mild, expected progression in tremor since last year, but still minimally bothersome.  Balance minimally affected.   -> continue PT   -> no new meds

## 2018-11-16 NOTE — PATIENT INSTRUCTIONS
Melatonin is to reduce the activity of dreams.  Take 3-5mg at bedtime (sold over-the-counter).  You may not feel any different, but you will have less risk of falling off the bed/couch and hurting yourself.

## 2018-11-16 NOTE — PROGRESS NOTES
"Luiz Mar I. Chief Complaints during this visit:  f/u Patient visit for PD    Primary Care Physician  Elias Westfall MD  [address]  [facility] [address]      History of present illness:   67 y.o.  M seen in f/u for PD.  Accompanied by wife and daughter.  Noticed a little more tremor in right hand.      Memory about the same.  Daughter noticed that some times he doesn't seem to process information like he should.    Currently in PT.  Taking less naps.  Nightmares less often, but still can have a "really bad one" per wife now and then.  He does not take the melatonin.    Strong sweet tooth.    Interval history 8/15/18:  Right -handed, travels from [facility].  Says theres is a little more tremor in right hand, worse with stress.  Memory about the same.  Less dreaming.  PT was beneficial.  No falls, but trips often.    Interval history 3/27/18:  Accompanied by daughter, Miracle.  Tremors are more pronounced in right hand.  Walk is less steady, trips, no falls.  Memory is getting worse.  Stays busy working in yard, training his dogs.  Hobbies- hunting ducks, deer, dove  Rare active dreaming.    Interval history 3/7/17:  Tremor is more obvious at times since last visit.  Active dreaming is less often.    Interval history 11/29/16:  No worsening since last seen.  Balance is still "ok."  Still dreaming actively about 2x/week.  Wife says he does hit at times.    Interval history 8/31/16:  ...consultation at the request of  Dr. Sharma (friend of daughter) for evaluation of tremor.  Accompanied wife and daughter.  Right hand tremor for about 6 months.  Bystolic started last fall.  Not long after (couple months), he started fighting in sleep.  Still talks, but not nearly as violent.  Notices more often when walking.  No problems with gait, balance.  Sense of smell is poor per wife.  Wife says that he asks questions more often.    Retired oil-rig contractor.  2015.  Paternal aunt penny had seizures and head tremor.   No " "etoh use.      II.  Review of systems  As in HPI,  otherwise, balance 3 systems reviewed and are negative.    III.  Past Medical History:   Diagnosis Date    Heart disease     right bundle brach block    Memory loss 8/31/2016    Parkinson disease 8/31/2016    Right hand resting tremor, mild bradykinesia, RBD     History reviewed. No pertinent family history.    Social history:  , retired      Current Outpatient Medications on File Prior to Visit   Medication Sig Dispense Refill    aspirin (ECOTRIN) 81 MG EC tablet Take 81 mg by mouth once daily.      BENICAR 20 mg tablet       ergocalciferol (VITAMIN D2) 50,000 unit Cap Take 50,000 Units by mouth every 7 days.      fish oil-omega-3 fatty acids 300-1,000 mg capsule Take by mouth once daily.      saw palmetto 160 MG capsule Take 160 mg by mouth 2 (two) times daily.      selegiline HCl (ELDEPRYL) 5 mg tablet Take 1 tablet (5 mg total) by mouth 2 (two) times daily with meals. 60 tablet 11    thiamine (VITAMIN B-1) 50 MG tablet Take 50 mg by mouth once daily.       No current facility-administered medications on file prior to visit.        PRIOR problem-specific medications tried:  na    Review of patient's allergies indicates:  No Known Allergies    IV. Physical Exam    Vitals:    11/16/18 1112   BP: (!) 160/80   Pulse: (!) 59   Weight: 90.4 kg (199 lb 4.7 oz)   Height: 5' 7" (1.702 m)     General appearance: Well nourished, well developed, no acute distress.         Cardiovascular:  pedal pulses 2, no edema or cyanosis, heart regular rate and rhythym, no carotid bruits.         -------------------------------------------------------------   Affect: full       Orientation to time & place:  Oriented to time, place, person and situation       Attention & concentration:  Normal attention span and concentration       Memory:  Recent and remote memory intact  Language: Spontaneous, fluent; able to repeat and name objects        Fund of knowledge:  Aware of " "current events            Unified Parkinson's Disease Rating Scale (motor part only)      UPDRS Motor Examination      Speech  1 - Slight loss of expression, dictation, and/or volumn.   Facial Expression  1 - Minimal Hypomimia, could be normal "Poker Face".   Rigidity     Neck 0 - Absent.   Upper Extremity: Right 2 - Mild or moderate.   Upper Extremity: Left 0 - Absent.   Lower Extremity: Right 0 - Absent.   Lower Extremity: Left 0 - Absent.     Finger Taps      right 1 - Mild slowing and/or reduction in amplitude.   left 0 - Normal.   Hand Movements      right 2 - Moderately impaired. Definite and early fatiguing. May have occasional arrests in movement.   left 0 - Normal.   Pronation/supination of Hands      right 2 - Moderately impaired. Definite and early fatiguing. May have occasional arrests in movement.   left 0 - Normal.     Toe Tapping    right 0 - Normal.   left 0 - Normal.     Leg Agility      right 0 - Normal.   left 0 - Normal.   Arising from Chair  0 - Normal.   Posture  0 - Normal erect.   Gait  0 - Normal, but dec armswing on right   Freezing of gait 0: Normal: No freezing.    Postural Stability (Response to sudden, strong posterior displacement produced by pull on shoulders while patient erect with eyes open and feet slightly apart.   0 - Normal.   Body Bradykinesia and Hypokinesia (Combining slowness, hesitancy, decreased armswing, small amplitude, and poverty of movement in general)  2 - Mild degree of slowness and poverty of movement which is definitely abnormal.     Tremor at Rest:      Face, lips, chin 0 - Absent.    Hands:      right 2 - Mild in amplitude and persistent. Or moderate in amplitude, but only intermittently present.    left 0 - Absent.    Feet:     right 0 - Absent.    left 0 - Absent.    Constancy of REST tremor: 1: Slight: Tremor at rest is present < 25% of the entire examination period.  (only when walking)   Postural tremor:    right 0 - Absent.    left 0 - Absent.    Kinetic " tremor:    right 0 - Absent.    left 0 - Absent.    Dyskinesias present? No       Total Motor UPDRS:  14      V.  Laboratory/ Radiological Data:     Lab Visit on 03/27/2018   Component Date Value Ref Range Status    TSH 03/27/2018 1.805  0.400 - 4.000 uIU/mL Final    Vitamin B-12 03/27/2018 625  210 - 950 pg/mL Final    Thiamine 03/27/2018 102  38 - 122 ug/L Final    Sodium 03/27/2018 141  136 - 145 mmol/L Final    Potassium 03/27/2018 4.0  3.5 - 5.1 mmol/L Final    Chloride 03/27/2018 103  95 - 110 mmol/L Final    CO2 03/27/2018 29  23 - 29 mmol/L Final    Glucose 03/27/2018 129* 70 - 110 mg/dL Final    BUN, Bld 03/27/2018 19  8 - 23 mg/dL Final    Creatinine 03/27/2018 1.3  0.5 - 1.4 mg/dL Final    Calcium 03/27/2018 10.1  8.7 - 10.5 mg/dL Final    Total Protein 03/27/2018 7.5  6.0 - 8.4 g/dL Final    Albumin 03/27/2018 4.4  3.5 - 5.2 g/dL Final    Total Bilirubin 03/27/2018 0.8  0.1 - 1.0 mg/dL Final    Alkaline Phosphatase 03/27/2018 67  55 - 135 U/L Final    AST 03/27/2018 18  10 - 40 U/L Final    ALT 03/27/2018 18  10 - 44 U/L Final    Anion Gap 03/27/2018 9  8 - 16 mmol/L Final    eGFR if African American 03/27/2018 >60.0  >60 mL/min/1.73 m^2 Final    eGFR if non African American 03/27/2018 56.1* >60 mL/min/1.73 m^2 Final    WBC 03/27/2018 6.86  3.90 - 12.70 K/uL Final    RBC 03/27/2018 5.37  4.60 - 6.20 M/uL Final    Hemoglobin 03/27/2018 14.1  14.0 - 18.0 g/dL Final    Hematocrit 03/27/2018 45.8  40.0 - 54.0 % Final    MCV 03/27/2018 85  82 - 98 fL Final    MCH 03/27/2018 26.3* 27.0 - 31.0 pg Final    MCHC 03/27/2018 30.8* 32.0 - 36.0 g/dL Final    RDW 03/27/2018 13.7  11.5 - 14.5 % Final    Platelets 03/27/2018 269  150 - 350 K/uL Final    MPV 03/27/2018 9.0* 9.2 - 12.9 fL Final    Immature Granulocytes 03/27/2018 0.6* 0.0 - 0.5 % Final    Gran # (ANC) 03/27/2018 4.6  1.8 - 7.7 K/uL Final    Immature Grans (Abs) 03/27/2018 0.04  0.00 - 0.04 K/uL Final         Outpatient  labs:  Wbc unremarkable; CMP unremarkable except mildly elevated glucose.  TSH 1.170    9/8/16 MRI brain personally reviewed and found it normal.                     VI. Assessment and Plan:    Problem List Items Addressed This Visit        1 - High    Parkinson's disease - Primary    Overview     Right hand resting tremor, mild bradykinesia, RBD         Current Assessment & Plan     Mild, expected progression in tremor since last year, but still minimally bothersome.  Balance minimally affected.   -> continue PT   -> no new meds            2     Subjective memory complaints    Overview     Forgets names.  3/2018 Zheng Cognitive Assessment:   28/30            3     Sleep disorder    Overview     Active dreaming           Current Assessment & Plan     Improved, less active dreaming events.   -> encouraged him to take melatonin                                          Follow-up in about 4 months (around 3/16/2019).

## 2019-01-25 ENCOUNTER — TELEPHONE (OUTPATIENT)
Dept: NEUROLOGY | Facility: CLINIC | Age: 69
End: 2019-01-25

## 2019-01-25 NOTE — TELEPHONE ENCOUNTER
Called and spoke to  regarding her 's medication (selegiline 5MG). She stated that on last visit with , they spoke about if she was going to change his medication due to him shaking more or less. She stated that some day he notice and other doesn't notice that the shakes are bad.

## 2019-01-25 NOTE — TELEPHONE ENCOUNTER
Called back and spoke to  regarding her 's medication (Selegiline 5MG). I stated that I spoke to  regarding this medication, she stated that she wants to keep it the same for now until the next visit in March.

## 2019-01-25 NOTE — TELEPHONE ENCOUNTER
----- Message from Madiha Cotton sent at 1/25/2019 12:19 PM CST -----  Needs Advice    Reason for call:asking if the doctor is going to change the patient medication or just call in a refill on medication: selegiline HCl (ELDEPRYL) 5 mg tablet, says the doctor indicated during the last visit that maybe she would change. Please call.        Communication Preference:Erin (wife) @ [phone] or     Additional Information:

## 2019-03-22 ENCOUNTER — OFFICE VISIT (OUTPATIENT)
Dept: NEUROLOGY | Facility: CLINIC | Age: 69
End: 2019-03-22
Payer: MEDICARE

## 2019-03-22 VITALS
BODY MASS INDEX: 31.94 KG/M2 | HEART RATE: 78 BPM | SYSTOLIC BLOOD PRESSURE: 169 MMHG | HEIGHT: 67 IN | DIASTOLIC BLOOD PRESSURE: 90 MMHG | WEIGHT: 203.5 LBS

## 2019-03-22 DIAGNOSIS — G20.A1 PARKINSON'S DISEASE: Primary | ICD-10-CM

## 2019-03-22 DIAGNOSIS — R41.89 SUBJECTIVE MEMORY COMPLAINTS: ICD-10-CM

## 2019-03-22 PROCEDURE — 99999 PR PBB SHADOW E&M-EST. PATIENT-LVL III: CPT | Mod: PBBFAC,,, | Performed by: PSYCHIATRY & NEUROLOGY

## 2019-03-22 PROCEDURE — 99213 OFFICE O/P EST LOW 20 MIN: CPT | Mod: PBBFAC | Performed by: PSYCHIATRY & NEUROLOGY

## 2019-03-22 PROCEDURE — 99214 PR OFFICE/OUTPT VISIT, EST, LEVL IV, 30-39 MIN: ICD-10-PCS | Mod: S$PBB,,, | Performed by: PSYCHIATRY & NEUROLOGY

## 2019-03-22 PROCEDURE — 99214 OFFICE O/P EST MOD 30 MIN: CPT | Mod: S$PBB,,, | Performed by: PSYCHIATRY & NEUROLOGY

## 2019-03-22 PROCEDURE — 99999 PR PBB SHADOW E&M-EST. PATIENT-LVL III: ICD-10-PCS | Mod: PBBFAC,,, | Performed by: PSYCHIATRY & NEUROLOGY

## 2019-03-22 NOTE — PROGRESS NOTES
"Luiz Mar I. Chief Complaints during this visit:  f/u Patient visit for PD    Primary Care Physician  Elias Westfall MD  [address]  [facility] [address]      History of present illness:   68 y.o.  M seen in f/u for PD.  Accompanied by wife.  Noticing more tremor in hands.     Balance is "slightly off."  No falls, but trips.  Would like to repeat PT.  Memory more affected.  Nightmares are not as bad as in past.     Interval history 11/28/18:  Noticed a little more tremor in right hand.    Memory about the same.  Daughter noticed that some times he doesn't seem to process information like he should.  Currently in PT.  Taking less naps.  Nightmares less often, but still can have a "really bad one" per wife now and then.  He does not take the melatonin.  Strong sweet tooth.    Interval history 8/15/18:  Right -handed, travels from [facility].  Says theres is a little more tremor in right hand, worse with stress.  Memory about the same.  Less dreaming.  PT was beneficial.  No falls, but trips often.    Interval history 3/27/18:  Accompanied by daughter, Miracle.  Tremors are more pronounced in right hand.  Walk is less steady, trips, no falls.  Memory is getting worse.  Stays busy working in yard, training his dogs.  Hobbies- hunting ducks, deer, dove  Rare active dreaming.    Interval history 3/7/17:  Tremor is more obvious at times since last visit.  Active dreaming is less often.    Interval history 11/29/16:  No worsening since last seen.  Balance is still "ok."  Still dreaming actively about 2x/week.  Wife says he does hit at times.    Interval history 8/31/16:  ...consultation at the request of  Dr. Sharma (friend of daughter) for evaluation of tremor.  Accompanied wife and daughter.  Right hand tremor for about 6 months.  Bystolic started last fall.  Not long after (couple months), he started fighting in sleep.  Still talks, but not nearly as violent.  Notices more often when walking.  No problems with " "gait, balance.  Sense of smell is poor per wife.  Wife says that he asks questions more often.    Retired oil-rig contractor.  2015.  Paternal aunt penny had seizures and head tremor.   No etoh use.      II.  Review of systems  As in HPI,  otherwise, balance 3 systems reviewed and are negative.    III.  Past Medical History:   Diagnosis Date    Heart disease     right bundle brach block    Memory loss 8/31/2016    Parkinson disease 8/31/2016    Right hand resting tremor, mild bradykinesia, RBD     History reviewed. No pertinent family history.    Social history:  , retired      Current Outpatient Medications on File Prior to Visit   Medication Sig Dispense Refill    aspirin (ECOTRIN) 81 MG EC tablet Take 81 mg by mouth once daily.      BENICAR 20 mg tablet       ergocalciferol (VITAMIN D2) 50,000 unit Cap Take 50,000 Units by mouth every 7 days.      fish oil-omega-3 fatty acids 300-1,000 mg capsule Take by mouth once daily.      saw palmetto 160 MG capsule Take 160 mg by mouth 2 (two) times daily.      selegiline HCl (ELDEPRYL) 5 mg tablet Take 1 tablet (5 mg total) by mouth 2 (two) times daily with meals. 60 tablet 11    thiamine (VITAMIN B-1) 50 MG tablet Take 50 mg by mouth once daily.       No current facility-administered medications on file prior to visit.        PRIOR problem-specific medications tried:  na    Review of patient's allergies indicates:  No Known Allergies    IV. Physical Exam    Vitals:    03/22/19 1202   BP: (!) 169/90   Pulse: 78   Weight: 92.3 kg (203 lb 7.8 oz)   Height: 5' 7" (1.702 m)       Wt Readings from Last 10 Encounters:   03/22/19 92.3 kg (203 lb 7.8 oz)   11/16/18 90.4 kg (199 lb 4.7 oz)   08/15/18 94.8 kg (209 lb)   03/27/18 90.1 kg (198 lb 10.2 oz)   03/07/17 92.3 kg (203 lb 7.8 oz)   11/29/16 93.3 kg (205 lb 11 oz)   08/31/16 98.1 kg (216 lb 4.3 oz)       General appearance: Well nourished, well developed, no acute distress.         Cardiovascular:  pedal " "pulses 2, no edema or cyanosis, heart regular rate and rhythym, no carotid bruits.         -------------------------------------------------------------   Affect: full       Orientation to time & place:  Oriented to time, place, person and situation       Attention & concentration:  Normal attention span and concentration       Memory:  Recent and remote memory intact  Language: Spontaneous, fluent; able to repeat and name objects        Fund of knowledge:  Aware of current events            Unified Parkinson's Disease Rating Scale (motor part only)      UPDRS Motor Examination      Speech  1 - Slight loss of expression, dictation, and/or volumn.   Facial Expression  1 - Minimal Hypomimia, could be normal "Poker Face".   Rigidity     Neck 0 - Absent.   Upper Extremity: Right 2 - Mild or moderate.   Upper Extremity: Left 0 - Absent.   Lower Extremity: Right 0 - Absent.   Lower Extremity: Left 0 - Absent.     Finger Taps      right 1 - Mild slowing and/or reduction in amplitude.   left 0 - Normal.   Hand Movements      right 2 - Moderately impaired. Definite and early fatiguing. May have occasional arrests in movement.   left 0 - Normal.   Pronation/supination of Hands      right 2 - Moderately impaired. Definite and early fatiguing. May have occasional arrests in movement.   left 0 - Normal.     Toe Tapping    right 0 - Normal.   left 0 - Normal.     Leg Agility      right 0 - Normal.   left 0 - Normal.   Arising from Chair  0 - Normal.   Posture  0 - Normal erect.   Gait  0 - Normal, but dec armswing on right   Freezing of gait 0: Normal: No freezing.    Postural Stability (Response to sudden, strong posterior displacement produced by pull on shoulders while patient erect with eyes open and feet slightly apart.   0 - Normal.   Body Bradykinesia and Hypokinesia (Combining slowness, hesitancy, decreased armswing, small amplitude, and poverty of movement in general)  2 - Mild degree of slowness and poverty of movement " which is definitely abnormal.     Tremor at Rest:      Face, lips, chin 0 - Absent.    Hands:      right 2 - Mild in amplitude and persistent. Or moderate in amplitude, but only intermittently present.    left 0 - Absent.    Feet:     right 0 - Absent.    left 0 - Absent.    Constancy of REST tremor: 1: Slight: Tremor at rest is present < 25% of the entire examination period.  (only when walking)   Postural tremor:    right 0 - Absent.    left 0 - Absent.    Kinetic tremor:    right 0 - Absent.    left 0 - Absent.    Dyskinesias present? No       Total Motor UPDRS:  14      V.  Laboratory/ Radiological Data:     Lab Visit on 03/27/2018   Component Date Value Ref Range Status    TSH 03/27/2018 1.805  0.400 - 4.000 uIU/mL Final    Vitamin B-12 03/27/2018 625  210 - 950 pg/mL Final    Thiamine 03/27/2018 102  38 - 122 ug/L Final    Sodium 03/27/2018 141  136 - 145 mmol/L Final    Potassium 03/27/2018 4.0  3.5 - 5.1 mmol/L Final    Chloride 03/27/2018 103  95 - 110 mmol/L Final    CO2 03/27/2018 29  23 - 29 mmol/L Final    Glucose 03/27/2018 129* 70 - 110 mg/dL Final    BUN, Bld 03/27/2018 19  8 - 23 mg/dL Final    Creatinine 03/27/2018 1.3  0.5 - 1.4 mg/dL Final    Calcium 03/27/2018 10.1  8.7 - 10.5 mg/dL Final    Total Protein 03/27/2018 7.5  6.0 - 8.4 g/dL Final    Albumin 03/27/2018 4.4  3.5 - 5.2 g/dL Final    Total Bilirubin 03/27/2018 0.8  0.1 - 1.0 mg/dL Final    Alkaline Phosphatase 03/27/2018 67  55 - 135 U/L Final    AST 03/27/2018 18  10 - 40 U/L Final    ALT 03/27/2018 18  10 - 44 U/L Final    Anion Gap 03/27/2018 9  8 - 16 mmol/L Final    eGFR if African American 03/27/2018 >60.0  >60 mL/min/1.73 m^2 Final    eGFR if non African American 03/27/2018 56.1* >60 mL/min/1.73 m^2 Final    WBC 03/27/2018 6.86  3.90 - 12.70 K/uL Final    RBC 03/27/2018 5.37  4.60 - 6.20 M/uL Final    Hemoglobin 03/27/2018 14.1  14.0 - 18.0 g/dL Final    Hematocrit 03/27/2018 45.8  40.0 - 54.0 % Final     MCV 03/27/2018 85  82 - 98 fL Final    MCH 03/27/2018 26.3* 27.0 - 31.0 pg Final    MCHC 03/27/2018 30.8* 32.0 - 36.0 g/dL Final    RDW 03/27/2018 13.7  11.5 - 14.5 % Final    Platelets 03/27/2018 269  150 - 350 K/uL Final    MPV 03/27/2018 9.0* 9.2 - 12.9 fL Final    Immature Granulocytes 03/27/2018 0.6* 0.0 - 0.5 % Final    Gran # (ANC) 03/27/2018 4.6  1.8 - 7.7 K/uL Final    Immature Grans (Abs) 03/27/2018 0.04  0.00 - 0.04 K/uL Final         Outpatient labs:  Wbc unremarkable; CMP unremarkable except mildly elevated glucose.  TSH 1.170    9/8/16 MRI brain personally reviewed and found it normal.                     VI. Assessment and Plan:    Problem List Items Addressed This Visit        1 - High    Parkinson's disease - Primary    Overview     Right hand resting tremor, mild bradykinesia, RBD         Current Assessment & Plan     Mild progression in tremor, and subjective progression in gait, but subtle.   -> PT/OT         Relevant Orders    Ambulatory referral to Neuropsychology    Ambulatory Referral to Physical/Occupational Therapy    Ambulatory consult to Occupational Therapy       2     Subjective memory complaints    Overview     Forgets names.  3/2018 Zheng Cognitive Assessment:   28/30         Current Assessment & Plan     He feels this is worsening.   -> neuropsych testing         Relevant Orders    Ambulatory referral to Neuropsychology                                     Follow-up in about 5 months (around 8/22/2019).

## 2019-05-22 DIAGNOSIS — G20.A1 PARKINSON DISEASE: ICD-10-CM

## 2019-05-22 RX ORDER — SELEGILINE HYDROCHLORIDE 5 MG/1
5 TABLET ORAL 2 TIMES DAILY WITH MEALS
Qty: 180 TABLET | Refills: 3 | Status: SHIPPED | OUTPATIENT
Start: 2019-05-22 | End: 2020-06-30 | Stop reason: SDUPTHER

## 2019-06-25 ENCOUNTER — HISTORICAL (OUTPATIENT)
Dept: ANESTHESIOLOGY | Facility: HOSPITAL | Age: 69
End: 2019-06-25

## 2019-07-16 ENCOUNTER — INITIAL CONSULT (OUTPATIENT)
Dept: NEUROLOGY | Facility: CLINIC | Age: 69
End: 2019-07-16
Payer: MEDICARE

## 2019-07-16 DIAGNOSIS — G20.A1 PARKINSON'S DISEASE: ICD-10-CM

## 2019-07-16 DIAGNOSIS — G31.84 MILD COGNITIVE IMPAIRMENT: ICD-10-CM

## 2019-07-16 PROCEDURE — 96133 NRPSYC TST EVAL PHYS/QHP EA: CPT | Mod: ,,, | Performed by: PSYCHIATRY & NEUROLOGY

## 2019-07-16 PROCEDURE — 96132 NRPSYC TST EVAL PHYS/QHP 1ST: CPT | Mod: ,,, | Performed by: PSYCHIATRY & NEUROLOGY

## 2019-07-16 PROCEDURE — 96138 PSYCL/NRPSYC TECH 1ST: CPT | Mod: ,,, | Performed by: PSYCHIATRY & NEUROLOGY

## 2019-07-16 PROCEDURE — 99499 UNLISTED E&M SERVICE: CPT | Mod: S$PBB,,, | Performed by: PSYCHIATRY & NEUROLOGY

## 2019-07-16 PROCEDURE — 96139 PR PSYCH/NEUROPSYCH TEST ADMIN/SCORING, BY TECH, 2+ TESTS, EA ADDTL 30 MIN: ICD-10-PCS | Mod: ,,, | Performed by: PSYCHIATRY & NEUROLOGY

## 2019-07-16 PROCEDURE — 96132 PR NEUROPSYCHOLOGIC TEST EVAL SVCS, 1ST HR: ICD-10-PCS | Mod: ,,, | Performed by: PSYCHIATRY & NEUROLOGY

## 2019-07-16 PROCEDURE — 96116 NUBHVL XM PHYS/QHP 1ST HR: CPT | Mod: S$PBB,,, | Performed by: PSYCHIATRY & NEUROLOGY

## 2019-07-16 PROCEDURE — 96139 PSYCL/NRPSYC TST TECH EA: CPT | Mod: ,,, | Performed by: PSYCHIATRY & NEUROLOGY

## 2019-07-16 PROCEDURE — 96138 PR PSYCH/NEUROPSYCH TEST ADMIN/SCORING, BY TECH, 2+ TESTS, 1ST 30 MIN: ICD-10-PCS | Mod: ,,, | Performed by: PSYCHIATRY & NEUROLOGY

## 2019-07-16 PROCEDURE — 96116 NUBHVL XM PHYS/QHP 1ST HR: CPT | Mod: PBBFAC | Performed by: PSYCHIATRY & NEUROLOGY

## 2019-07-16 PROCEDURE — 96116 PR NEUROBEHAVIORAL STATUS EXAM BY PSYCH/PHYS: ICD-10-PCS | Mod: S$PBB,,, | Performed by: PSYCHIATRY & NEUROLOGY

## 2019-07-16 PROCEDURE — 99499 NO LOS: ICD-10-PCS | Mod: S$PBB,,, | Performed by: PSYCHIATRY & NEUROLOGY

## 2019-07-16 PROCEDURE — 96133 PR NEUROPSYCHOLOGIC TEST EVAL SVCS, EA ADDTL HR: ICD-10-PCS | Mod: ,,, | Performed by: PSYCHIATRY & NEUROLOGY

## 2019-07-16 NOTE — PROGRESS NOTES
"NEUROPSYCHOLOGICAL EVALUATION - CONFIDENTIAL    REFERRAL SOURCE: Tina Jaramillo MD  MEDICAL NECESSITY:  Evaluate cognitive functioning in the setting of Parkinson's disease  DATE CONDUCTED: 7/16/2019    SOURCES OF INFORMATION:  The following was gathered from a clinical interview with Mr. Luiz Mar, a separate interview with his wife, and review of the available medical records. Mr. Mar expressed an understanding of the purpose of the evaluation and consented to all procedures. Total licensed billing psychologists professional time including clinical interview, test administration and interpretation of tests administered by the billing psychologist, integration of test results and other clinical data, preparing the final report, and personally reporting results to the patient   05091 - 60 minutes, 22634/27222 - 180 minutes, [id number]/[id number] - 148 minutes.     HISTORY OF PRESENT ILLNESS: Mr. Luiz Mar is a 68 y.o., right-handed,   male with 15 years of education who was referred for a neuropsychological evaluation in the setting of Parkinson's disease. He established care with [facility] in 2016 in the setting of tremor and REM sleep behavior disorder and was diagnosed with PD shortly thereafter. He also reported mild cognitive inefficiencies at that time with normal MoCA. He reported progressive decline since diagnosis, describing these changes as frustrating rather than functionally debilitating. He will forget why he walked into a room or where he put down an item (such as his car keys). He reported intermittent word finding difficulties that he noted is starting to become "slightly problematic." He also has occasional name retrieval issues, but is not sure that it is worse than most people his age. He denied forgetting conversations, bradyphrenia, or changes in multitasking/set shifting.     Mr. Mar's wife indicated that their children have commented on his forgetfulness, but she " "does not believe he has experienced a noticeable change from baseline. She feels that he has always been prone to misplacing items around the house. She described him as an individual who was hyperfocused on work and is now hyperfocused on his hobby (president of hunting retrieval club of [employer]). As a result, he has always been prone to forgetfulness around the house, but not within his job/hobby. His wife also notices that he does not put things in a consistent place. For instance, he will have trouble finding his keys or headphones after placing them in irregular locations. He spends a great deal of his time on the phone with members of the hunting retrieval club, with no problems managing his responsibilities according to his wife.     IADLS/DAILY FUNCTIONING:   Support System: Resides with wife. Their daughter and grandson have been staying with them for the past 2-3 years.   Appointment Management: Jointly with wife, no reported problems.   Medication Compliance: independent. He fills a daily pillbox. There are instances when he can't remember whether or not he has taken in medication, so he regularly checks the pillbox. While he reported strong compliance, his wife indicated that he is prone to forgetting to take his medications when he is busy. He also doesn't notice too much benefit from the medication.   Financial Management: Wife has always managed.   Cooking: He doesn't cook, which has always been the case.   Driving: More "eye strain" at night with early cataracts. More cautious at night. No recent MVCs or close calls.       MEDICAL HISTORY: Mr. Mar has a past medical history of Heart disease and obstructive sleep apnea (MARY). He typically takes about an hour nap each day and can easily doze off during the day. He typically falls asleep while watching television in the recliner around 8-9pm. He wakes up around midnight, watchs television for a few hours, and then falls back asleep for a few hours. " "He does this about 5 days each week and doesn't use his CPAP on these nights. He finds that he feels more well rested after using a CPAP. He continues to have dream enactment behavior, including yelling and thrashing in his sleep.      NEUROIMAGING:  Per Dr. Jaramillo note: "16 MRI brain personally reviewed and found it normal."    SUBSTANCE USE: Mr. Mar  reports that he has never smoked. He does not have any smokeless tobacco history on file. He reports that he does not drink alcohol or use drugs.    CURRENT MEDICATIONS:    Current Outpatient Medications:     aspirin (ECOTRIN) 81 MG EC tablet, Take 81 mg by mouth once daily., Disp: , Rfl:     BENICAR 20 mg tablet, , Disp: , Rfl:     ergocalciferol (VITAMIN D2) 50,000 unit Cap, Take 50,000 Units by mouth every 7 days., Disp: , Rfl:     fish oil-omega-3 fatty acids 300-1,000 mg capsule, Take by mouth once daily., Disp: , Rfl:     saw palmetto 160 MG capsule, Take 160 mg by mouth 2 (two) times daily., Disp: , Rfl:     selegiline HCl (ELDEPRYL) 5 mg tablet, Take 1 tablet (5 mg total) by mouth 2 (two) times daily with meals., Disp: 180 tablet, Rfl: 3    thiamine (VITAMIN B-1) 50 MG tablet, Take 50 mg by mouth once daily., Disp: , Rfl:      PSYCHIATRIC HISTORY: Unremarkable pre-morbid psychiatric history. He described his baseline personality as "cautious." His wife recently wondered about his mood due to his urethra being punctured during a hernia repair surgery, potentially requiring invasive surgery in the future. He denied any changes/problems with mood when she approached him about this.     SUICIDAL IDEATION:  History: Denied  Active Suicidal Ideation:Denied  Plan/Intent: Denied    FAMILY HISTORY: No family history of PD or other neurological disorders. Father  at 60, mother at 82 with cancer.     PSYCHOSOCIAL HISTORY:   Education:   Level Attained: 3 years of college. Described himself an above average student in high school and average student in " college.    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    Retired: 2016    Relationship Status:   : yes, 49 years   Children: 3    MENTAL STATUS AND OBSERVATIONS:  APPEARANCE: Casually dressed and adequate grooming/hygiene.   ALERTNESS/ORIENTATION: Attentive and alert.   GAIT/MOTOR: Right hand resting tremor.   SENSORY: Corrective lenses.   SPEECH/LANGUAGE: Normal in rate, rhythm, tone, and volume. No significant word finding difficulty noted. Expressive and receptive language was normal.  STATED MOOD/AFFECT: Mood was euthymic.   INTERPERSONAL BEHAVIOR: Rapport was quickly and easily established   THOUGHT PROCESSES: Thoughts seemed logical and goal-directed.   TEST TAKING BEHAVIOR and VALIDITY: Scores on embedded measures of performance validity were within normal limits. .     TEST RESULTS: The test scores are also included in an appendix to this report.      Mental Status:   9/2016 MOCA = 26/30  3/2018 MOCA = 28/30  7/2019 MOCA = 27/30    Fully oriented to time and place. He encoded 5/5 words after 2 trials, freely recalling 3/5 following a brief delay. He recalled 1 word with categorical cueing and correctly identified the remaining word with multiple choice cueing. He accurately munira a clock face and correctly set the clock hands to the designated time. He provided 5/5 correct responses on a serial 7 subtraction task.     Pre-morbid/Baseline: Estimated to be in the average range. A composite score of his overall cognitive abilities was in the borderline range.     Language: Intact confrontation naming. Semantic verbal fluency ranged from extremely low to borderline. Letter verbal fluency was low average.     Visuospatial: Copy of a complex figure was within normal limits with no evidence of  visuospatial dysfunction. Performance on a test of visual acuity/line orientation was low average.     Learning/Memory:   WMS/HVLT: Overall encoding of two short stories was low average/average. Retention was below expectation following a long delay as he required a cue/prompt for both stories. He recalled 3 of 10 previously encoded details from the first story and 5 of 8 from the second story. Responses to yes/no questions pertaining to the stories was within normal limits. Overall encoding of a supraspan word list was low average as she recalled 6, 6, and 6 of 12 words across the learning trials. He did not freely recall any words following a long delay. His performance improved to the low average range with recognition cueing.     RBANS: Overall encoding of a supraspan word list was low average as he recalled 5, 6, 6, and 6 of 10 words across the learning trials. Retention was below expectation following a long delay as he freely recalled 2/10 words (borderline). Recognition was also below expectation as he correctly identified 7/10 words with no false positive errors. Overall encoding of a short story was low average. Retention was below expectation following a long delay as he recalled 4 of 7 previously encoded details. Overall recall was borderline. Incidental visual memory for a previously copied figure was low average.     Executive Functioning: Average working memory. Processing speed was consistently low average. Performance on a test of divided attention/set shifting was borderline. One trial learning/encoding was low average/average. Performance on a card sorting test was within normal limits with no evidence of perseveration. He did have one inattentive set loss error.     Mood: Responses on self-report measures were not suggestive of clinically significant depression or anxiety.     SUMMARY AND IMPRESSION: Mr. Luiz Mar is a 68 y.o. Male who was referred for a neuropsychological evaluation in the  setting of Parkinson's disease (diagnosed in 2016).     Neuropsychological testing revealed a pattern of mild to moderate fronto-subcortical dysfunction, consistent with the type of deficits often seen in Parkinson's disease. Specifically, weaknesses were noted in encoding, cognitive organization/retrieval, processing speed, verbal fluency, and divided attention/set shifting. 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 This diagnosis is also consistent with his current level of functioning as he is increasingly reliant on compensatory mechanisms, such as a pillbox. Cerebrovascular disease, especially untreated sleep apnea, may be an additional contributing factor to cognitive decline. Several recommendations are offered to assist in his care/treatment.     DIAGNOSIS:  1. 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 Compensatory Mechanisms - Mr. Mar is reportedly prone to missing medications when he is busy. It is recommended that he set a recurring alarm/timer on his phone for medication reminders.   2. Sleepy Hygiene/CPAP Compliance - Mr. Mar is encouraged to develop a more consistent nighttime regimen. It is also recommended that he improve CPAP compliance as untreated MARY can contribute to cognitive dysfunction.   3. Optimize Brain Health - Prioritize physical and social activity. Maintain a heart healthy diet.   4. Level of Care/Support - Appropriately managed. Mr. Mar may require increased medication oversight if he experiences further cognitive decline.   5. Neuropsychology Follow-up - 1-2 years to assess for interval change.     Mr. Mar will be provided the results of the evaluation.     Thank you for allowing me to participate in Mr. Dave care.  If you have any questions, please contact me at [phone].    Bautista Garcia Psy.D., ABPP  Board Certified in Clinical  Neuropsychology  [facility] - Department of Neurology    APPENDIX  TESTS ADMINISTERED:  Clinical Interview and Review of Records, Zheng Cognitive Assessment (MoCA), Test of Premorbid Functioning (TOPF), Repeatable Battery for the Assessment of Neuropsychological Status (RBANS, Form A), selected subtests from the Wechsler Adult Intelligence Scale - 4th Edition (WAIS-IV), Logical Memory subtest form the Wechsler Memory Scale - 4th Edition (WMS-IV), العراقي Verbal Learning Test - Revised (HVLT-R), Jamel Complex Figure (copy trial only), Trailmaking Test Part A and B, Controlled Oral Word Association Test (COWAT), Semantic Verbal Fluency (Animals), Naming subtest from the NAB, Symbol Digit Modalities Test (SDMT), [place] Card Sorting Test - 64 card version (WCST-64), Generalized Anxiety Disorder - 7 (MOSES-7), and the Parks Depression Inventory - second edition (BDI-2).     TOPF    Standard Score  (+ simple demographics) 100 (predicted FSIQ = 106)     MoCA    27/30     RBANS   Index  Immediate Memory  78    Visuospatial/Construction 92   Language   82    Attention   94    Delayed Memory  71    Total    79      Subtests   Scaled Scores  List learning   6  Story Memory   6  Figure Copy   11  Line Orientation  17-25%  Naming   51-75%  Fluency   3  Digit Span   12  Coding    6  List Recall   3-9%  List Recognition  3-9%  Story Recall   4  Figure Recall   7    WAIS-IV   Index  Working Memory Index 100    Individual subtests  Scaled Score    Digit Span   11   LDF   8 (raw)   LDB   4 (raw)   LDS   6 (raw)  RDS   9  Arithmetic   9    WMS-IV   Scaled Score  Logical Memory I  8  Logical Memory II  5    Logical Memory II Recog. 18/23 (raw) 26-50% (base rate)    HVLT-R   T scores   T1    6  T2    6  T3    6  Total    39  DR    <20 (0)  Retention   <20 (0%)  Recog Discrim  42 (9/12 hits, 0 fps)    Jamel Complex Figure  Percentile  Copy    >16% (34/36)  Time    >16% (220 seconds)                             [facility] Norms    T-Score  Trails A   46  Trails B   32 (1 error)  FAS    42  Animal fluency   35    NAB    T-Score  Naming   56 (31/31)    WCST-64   Standard Score  Total Errors   100  Perseverative Errors  98  Nonperseverative Errors 95  Categories Completed >16% (3)  Trails to 1st   >16% (12)  FMS    1  Learning to learn  >16% (-5.16)                             SDMT    Z-Score  Written    -1.2  Oral    -0.8    BDI-2    7  YORDY    3

## 2019-08-12 ENCOUNTER — TELEPHONE (OUTPATIENT)
Dept: NEUROLOGY | Facility: CLINIC | Age: 69
End: 2019-08-12

## 2019-08-12 NOTE — TELEPHONE ENCOUNTER
NEUROPSYCHOLOGICAL EVALUATION - CONFIDENTIAL  FEEDBACK NOTE    On 8/12/2019, I provided Mr. Luiz aMr neuropsychological evaluation results. Please see the full report for a comprehensive overview of the findings. Mr. Mar was provided a copy of the report and invited to call with additional questions.      Bautista Garcia Psy.D., ABPP  Board Certified in Clinical Neuropsychology  [facility] - Department of Neurology

## 2019-08-20 ENCOUNTER — HISTORICAL (OUTPATIENT)
Dept: ANESTHESIOLOGY | Facility: HOSPITAL | Age: 69
End: 2019-08-20

## 2019-09-19 ENCOUNTER — TELEPHONE (OUTPATIENT)
Dept: NEUROLOGY | Facility: CLINIC | Age: 69
End: 2019-09-19

## 2019-09-19 NOTE — TELEPHONE ENCOUNTER
----- Message from Madiha Cotton sent at 9/18/2019  3:24 PM CDT -----  Contact: pt @ [phone]  Calling to schedule an appt with Dr. Jaramillo, please call.

## 2019-10-01 ENCOUNTER — TELEPHONE (OUTPATIENT)
Dept: NEUROLOGY | Facility: CLINIC | Age: 69
End: 2019-10-01

## 2019-10-01 NOTE — TELEPHONE ENCOUNTER
----- Message from Alex Tracey sent at 10/1/2019 11:47 AM CDT -----  Contact: pt @ [phone]  Pt is calling back to reschedule canceled appt on 08/20/19

## 2019-10-10 ENCOUNTER — TELEPHONE (OUTPATIENT)
Dept: NEUROLOGY | Facility: CLINIC | Age: 69
End: 2019-10-10

## 2019-10-10 NOTE — TELEPHONE ENCOUNTER
----- Message from Alex Tracey sent at 10/9/2019 10:55 AM CDT -----  Contact: pt @ [phone] (pls leave VM if no answer)  Pt is calling to reschedule canceled appt on 10/21

## 2019-11-12 ENCOUNTER — OFFICE VISIT (OUTPATIENT)
Dept: NEUROLOGY | Facility: CLINIC | Age: 69
End: 2019-11-12
Payer: MEDICARE

## 2019-11-12 VITALS
SYSTOLIC BLOOD PRESSURE: 158 MMHG | HEART RATE: 74 BPM | HEIGHT: 67 IN | DIASTOLIC BLOOD PRESSURE: 90 MMHG | BODY MASS INDEX: 31.87 KG/M2

## 2019-11-12 DIAGNOSIS — G31.84 MILD COGNITIVE IMPAIRMENT: ICD-10-CM

## 2019-11-12 DIAGNOSIS — G20.A1 PARKINSON'S DISEASE: Primary | ICD-10-CM

## 2019-11-12 PROCEDURE — 99214 PR OFFICE/OUTPT VISIT, EST, LEVL IV, 30-39 MIN: ICD-10-PCS | Mod: S$PBB,,, | Performed by: PSYCHIATRY & NEUROLOGY

## 2019-11-12 PROCEDURE — 99999 PR PBB SHADOW E&M-EST. PATIENT-LVL III: ICD-10-PCS | Mod: PBBFAC,,, | Performed by: PSYCHIATRY & NEUROLOGY

## 2019-11-12 PROCEDURE — 99213 OFFICE O/P EST LOW 20 MIN: CPT | Mod: PBBFAC | Performed by: PSYCHIATRY & NEUROLOGY

## 2019-11-12 PROCEDURE — 99214 OFFICE O/P EST MOD 30 MIN: CPT | Mod: S$PBB,,, | Performed by: PSYCHIATRY & NEUROLOGY

## 2019-11-12 PROCEDURE — 99999 PR PBB SHADOW E&M-EST. PATIENT-LVL III: CPT | Mod: PBBFAC,,, | Performed by: PSYCHIATRY & NEUROLOGY

## 2019-11-12 RX ORDER — ROSUVASTATIN CALCIUM 10 MG/1
TABLET, COATED ORAL
COMMUNITY
Start: 2019-10-31

## 2019-11-12 RX ORDER — CARBIDOPA AND LEVODOPA 50; 200 MG/1; MG/1
1 TABLET, EXTENDED RELEASE ORAL 2 TIMES DAILY
Qty: 270 TABLET | Refills: 3 | Status: SHIPPED | OUTPATIENT
Start: 2019-11-12 | End: 2020-11-30 | Stop reason: SDUPTHER

## 2019-11-12 RX ORDER — RIVASTIGMINE TARTRATE 1.5 MG/1
1.5 CAPSULE ORAL 2 TIMES DAILY
Qty: 60 CAPSULE | Refills: 11 | Status: SHIPPED | OUTPATIENT
Start: 2019-11-12 | End: 2020-11-11

## 2019-11-12 RX ORDER — OLMESARTAN MEDOXOMIL 20 MG/1
40 TABLET ORAL DAILY
COMMUNITY

## 2019-11-12 NOTE — PROGRESS NOTES
"Luiz BRICENO Chief Complaints during this visit:  f/u Patient visit for PD    Primary Care Physician  Elias Westfall MD  [address]  [facility] [address]      History of present illness:   68 y.o.  M seen in f/u for PD.  Accompanied by wife and daughter.  Tremors are more prevalent.  Hand is starting to ache as well. Has to forcibly open in the am.  Balance about the same.  No falls.    Cognitive eval revealed MCI, advised to use cpap.    No bad dreams in a long time.    3/22/19  Noticing more tremor in hands.   Balance is "slightly off."  No falls, but trips.  Would like to repeat PT.  Memory more affected.  Nightmares are not as bad as in past.     Interval history 11/28/18:  Noticed a little more tremor in right hand.    Memory about the same.  Daughter noticed that some times he doesn't seem to process information like he should.  Currently in PT.  Taking less naps.  Nightmares less often, but still can have a "really bad one" per wife now and then.  He does not take the melatonin.  Strong sweet tooth.    Interval history 8/15/18:  Right -handed, travels from [facility].  Says theres is a little more tremor in right hand, worse with stress.  Memory about the same.  Less dreaming.  PT was beneficial.  No falls, but trips often.    Interval history 3/27/18:  Accompanied by daughter, Miracle.  Tremors are more pronounced in right hand.  Walk is less steady, trips, no falls.  Memory is getting worse.  Stays busy working in yard, training his dogs.  Hobbies- hunting ducks, deer, dove  Rare active dreaming.    Interval history 3/7/17:  Tremor is more obvious at times since last visit.  Active dreaming is less often.    Interval history 11/29/16:  No worsening since last seen.  Balance is still "ok."  Still dreaming actively about 2x/week.  Wife says he does hit at times.    Interval history 8/31/16:  ...consultation at the request of  Dr. Sharma (friend of daughter) for evaluation of tremor.  Accompanied wife " "and daughter.  Right hand tremor for about 6 months.  Bystolic started last fall.  Not long after (couple months), he started fighting in sleep.  Still talks, but not nearly as violent.  Notices more often when walking.  No problems with gait, balance.  Sense of smell is poor per wife.  Wife says that he asks questions more often.    Retired oil-rig contractor.  2015.  Paternal aunt penny had seizures and head tremor.   No etoh use.      II.  Review of systems  As in HPI,  otherwise, balance 3 systems reviewed and are negative.    III.  Past Medical History:   Diagnosis Date    Heart disease     right bundle brach block    Memory loss 8/31/2016    Parkinson disease 8/31/2016    Right hand resting tremor, mild bradykinesia, RBD     History reviewed. No pertinent family history.    Social history:  , retired      Current Outpatient Medications on File Prior to Visit   Medication Sig Dispense Refill    aspirin (ECOTRIN) 81 MG EC tablet Take 81 mg by mouth once daily.      BENICAR 20 mg tablet       ergocalciferol (VITAMIN D2) 50,000 unit Cap Take 50,000 Units by mouth every 7 days.      fish oil-omega-3 fatty acids 300-1,000 mg capsule Take by mouth once daily.      olmesartan (BENICAR) 20 MG tablet Take 40 mg by mouth once daily.      rosuvastatin (CRESTOR) 10 MG tablet       saw palmetto 160 MG capsule Take 160 mg by mouth 2 (two) times daily.      selegiline HCl (ELDEPRYL) 5 mg tablet Take 1 tablet (5 mg total) by mouth 2 (two) times daily with meals. 180 tablet 3    thiamine (VITAMIN B-1) 50 MG tablet Take 50 mg by mouth once daily.       No current facility-administered medications on file prior to visit.        PRIOR problem-specific medications tried:  na    Review of patient's allergies indicates:  No Known Allergies    IV. Physical Exam    Vitals:    11/12/19 1237   BP: (!) 158/90   Pulse: 74   Height: 5' 7" (1.702 m)       Wt Readings from Last 10 Encounters:   03/22/19 92.3 kg (203 lb 7.8 " "oz)   11/16/18 90.4 kg (199 lb 4.7 oz)   08/15/18 94.8 kg (209 lb)   03/27/18 90.1 kg (198 lb 10.2 oz)   03/07/17 92.3 kg (203 lb 7.8 oz)   11/29/16 93.3 kg (205 lb 11 oz)   08/31/16 98.1 kg (216 lb 4.3 oz)       General appearance: Well nourished, well developed, no acute distress.         Cardiovascular:  pedal pulses 2, no edema or cyanosis, heart regular rate and rhythym, no carotid bruits.         -------------------------------------------------------------   Affect: full       Orientation to time & place:  Oriented to time, place, person and situation       Attention & concentration:  Normal attention span and concentration       Memory:  Recent and remote memory intact  Language: Spontaneous, fluent; able to repeat and name objects        Fund of knowledge:  Aware of current events            Unified Parkinson's Disease Rating Scale (motor part only)      UPDRS Motor Examination      Speech  1 - Slight loss of expression, dictation, and/or volumn.   Facial Expression  1 - Minimal Hypomimia, could be normal "Poker Face".   Rigidity     Neck 0 - Absent.   Upper Extremity: Right 2 - Mild or moderate.   Upper Extremity: Left 0 - Absent.   Lower Extremity: Right 0 - Absent.   Lower Extremity: Left 0 - Absent.     Finger Taps      right 1 - Mild slowing and/or reduction in amplitude.   left 0 - Normal.   Hand Movements      right 2 - Moderately impaired. Definite and early fatiguing. May have occasional arrests in movement.   left 0 - Normal.   Pronation/supination of Hands      right 2 - Moderately impaired. Definite and early fatiguing. May have occasional arrests in movement.   left 0 - Normal.     Toe Tapping    right 0 - Normal.   left 0 - Normal.     Leg Agility      right 0 - Normal.   left 0 - Normal.   Arising from Chair  0 - Normal.   Posture  0 - Normal erect.   Gait  0 - Normal, but dec armswing on right   Freezing of gait 0: Normal: No freezing.    Postural Stability (Response to sudden, strong " posterior displacement produced by pull on shoulders while patient erect with eyes open and feet slightly apart.   0 - Normal.   Body Bradykinesia and Hypokinesia (Combining slowness, hesitancy, decreased armswing, small amplitude, and poverty of movement in general)  2 - Mild degree of slowness and poverty of movement which is definitely abnormal.     Tremor at Rest:      Face, lips, chin 0 - Absent.    Hands:      right 2 - Mild in amplitude and persistent. Or moderate in amplitude, but only intermittently present.    left 0 - Absent.    Feet:     right 0 - Absent.    left 0 - Absent.    Constancy of REST tremor: 1: Slight: Tremor at rest is present < 25% of the entire examination period.  (only when walking)   Postural tremor:    right 0 - Absent.    left 0 - Absent.    Kinetic tremor:    right 0 - Absent.    left 0 - Absent.    Dyskinesias present? No       Total Motor UPDRS:  14      V.  Laboratory/ Radiological Data:     neuropsych 7/16/19:  Neuropsychological testing revealed a pattern of mild to moderate fronto-subcortical dysfunction, consistent with the type of deficits often seen in Parkinson's disease. Specifically, weaknesses were noted in encoding, cognitive organization/retrieval, processing speed, verbal fluency, and divided attention/set shifting. 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 This diagnosis is also consistent with his current level of functioning as he is increasingly reliant on compensatory mechanisms, such as a pillbox. Cerebrovascular disease, especially untreated sleep apnea, may be an additional contributing factor to cognitive decline.    Lab Visit on 03/27/2018   Component Date Value Ref Range Status    TSH 03/27/2018 1.805  0.400 - 4.000 uIU/mL Final    Vitamin B-12 03/27/2018 625  210 - 950 pg/mL Final    Thiamine 03/27/2018 102  38 - 122 ug/L Final    Sodium 03/27/2018 141  136 - 145 mmol/L Final     Potassium 03/27/2018 4.0  3.5 - 5.1 mmol/L Final    Chloride 03/27/2018 103  95 - 110 mmol/L Final    CO2 03/27/2018 29  23 - 29 mmol/L Final    Glucose 03/27/2018 129* 70 - 110 mg/dL Final    BUN, Bld 03/27/2018 19  8 - 23 mg/dL Final    Creatinine 03/27/2018 1.3  0.5 - 1.4 mg/dL Final    Calcium 03/27/2018 10.1  8.7 - 10.5 mg/dL Final    Total Protein 03/27/2018 7.5  6.0 - 8.4 g/dL Final    Albumin 03/27/2018 4.4  3.5 - 5.2 g/dL Final    Total Bilirubin 03/27/2018 0.8  0.1 - 1.0 mg/dL Final    Alkaline Phosphatase 03/27/2018 67  55 - 135 U/L Final    AST 03/27/2018 18  10 - 40 U/L Final    ALT 03/27/2018 18  10 - 44 U/L Final    Anion Gap 03/27/2018 9  8 - 16 mmol/L Final    eGFR if African American 03/27/2018 >60.0  >60 mL/min/1.73 m^2 Final    eGFR if non African American 03/27/2018 56.1* >60 mL/min/1.73 m^2 Final    WBC 03/27/2018 6.86  3.90 - 12.70 K/uL Final    RBC 03/27/2018 5.37  4.60 - 6.20 M/uL Final    Hemoglobin 03/27/2018 14.1  14.0 - 18.0 g/dL Final    Hematocrit 03/27/2018 45.8  40.0 - 54.0 % Final    MCV 03/27/2018 85  82 - 98 fL Final    MCH 03/27/2018 26.3* 27.0 - 31.0 pg Final    MCHC 03/27/2018 30.8* 32.0 - 36.0 g/dL Final    RDW 03/27/2018 13.7  11.5 - 14.5 % Final    Platelets 03/27/2018 269  150 - 350 K/uL Final    MPV 03/27/2018 9.0* 9.2 - 12.9 fL Final    Immature Granulocytes 03/27/2018 0.6* 0.0 - 0.5 % Final    Gran # (ANC) 03/27/2018 4.6  1.8 - 7.7 K/uL Final    Immature Grans (Abs) 03/27/2018 0.04  0.00 - 0.04 K/uL Final         Outpatient labs:  Wbc unremarkable; CMP unremarkable except mildly elevated glucose.  TSH 1.170    9/8/16 MRI brain personally reviewed and found it normal.                     VI. Assessment and Plan:    Problem List Items Addressed This Visit        1 - High    Parkinson's disease - Primary    Overview     Right hand resting tremor, mild bradykinesia, RBD         Current Assessment & Plan     Over-all, not doing too bad, but the  hand clenching overnight concerns me and may imply we are a little under-medicated.   -> change to carbidopa-levodopa 50/200   -> OT   -> consider hand brace at night         Relevant Medications    carbidopa-levodopa  mg (SINEMET CR)  mg TbSR    Other Relevant Orders    Ambulatory consult to Occupational Therapy       2     Mild cognitive impairment    Overview     Forgets names.  3/2018 [place] Cognitive Assessment:   28/30         Current Assessment & Plan     Noticing progression.   -> start exelon         Relevant Medications    rivastigmine tartrate (EXELON) 1.5 MG capsule                                     Follow up in about 4 months (around 3/12/2020).

## 2019-11-15 NOTE — ASSESSMENT & PLAN NOTE
Over-all, not doing too bad, but the hand clenching overnight concerns me and may imply we are a little under-medicated.   -> change to carbidopa-levodopa 50/200   -> OT   -> consider hand brace at night

## 2020-03-05 ENCOUNTER — TELEPHONE (OUTPATIENT)
Dept: NEUROLOGY | Facility: CLINIC | Age: 70
End: 2020-03-05

## 2020-03-05 NOTE — TELEPHONE ENCOUNTER
----- Message from Alex Tracey sent at 3/5/2020  4:15 PM CST -----  Contact: wife @ [phone]  Is asking to speak w/ the nurse, states it's concerning medication, not sure of name but it's the medication for memory. States medication gave pt headache and pt experienced nausea.

## 2020-03-12 ENCOUNTER — TELEPHONE (OUTPATIENT)
Dept: NEUROLOGY | Facility: CLINIC | Age: 70
End: 2020-03-12

## 2020-03-12 NOTE — TELEPHONE ENCOUNTER
----- Message from Jasmyne Brennan sent at 3/12/2020 11:07 AM CDT -----  Contact: Pt  Reason: Pt wife returning Kyra call     Communication: [phone]

## 2020-03-13 NOTE — TELEPHONE ENCOUNTER
Spoke to pts daughter. Exelon is causing a lot of nausea and headache.   Asks if there is another medicine he might try instead of Exelon. Advised will check with GL and let her know.

## 2020-06-30 DIAGNOSIS — G20.A1 PARKINSON DISEASE: ICD-10-CM

## 2020-06-30 RX ORDER — SELEGILINE HYDROCHLORIDE 5 MG/1
5 TABLET ORAL 2 TIMES DAILY WITH MEALS
Qty: 180 TABLET | Refills: 3 | Status: SHIPPED | OUTPATIENT
Start: 2020-06-30 | End: 2021-06-24

## 2020-06-30 NOTE — TELEPHONE ENCOUNTER
----- Message from Madiha Cotton sent at 6/30/2020  8:47 AM CDT -----  Regarding: refill  Contact: Erin (wife) @ [phone]  Rx Refill/Request     Is this a Refill or New Rx:  refill (3 month supply)    Rx Name and Strength:  selegiline HCl (ELDEPRYL) 5 mg tablet    Preferred Pharmacy with phone number:     [facility] [address] [address]  Phone: [phone] Fax: [phone]      Communication Preference:Erin (wife) @ [phone]  Additional Information:

## 2020-11-24 ENCOUNTER — PATIENT MESSAGE (OUTPATIENT)
Dept: NEUROLOGY | Facility: CLINIC | Age: 70
End: 2020-11-24

## 2020-11-24 DIAGNOSIS — G20.A1 PARKINSON'S DISEASE: ICD-10-CM

## 2020-11-25 RX ORDER — CARBIDOPA AND LEVODOPA 50; 200 MG/1; MG/1
1 TABLET, EXTENDED RELEASE ORAL 2 TIMES DAILY
Qty: 180 TABLET | Refills: 1 | Status: CANCELLED | OUTPATIENT
Start: 2020-11-25

## 2020-11-30 ENCOUNTER — PATIENT MESSAGE (OUTPATIENT)
Dept: NEUROLOGY | Facility: CLINIC | Age: 70
End: 2020-11-30

## 2020-11-30 DIAGNOSIS — G20.A1 PARKINSON'S DISEASE: ICD-10-CM

## 2020-12-01 ENCOUNTER — PATIENT MESSAGE (OUTPATIENT)
Dept: NEUROLOGY | Facility: CLINIC | Age: 70
End: 2020-12-01

## 2020-12-01 ENCOUNTER — TELEPHONE (OUTPATIENT)
Dept: NEUROLOGY | Facility: CLINIC | Age: 70
End: 2020-12-01

## 2020-12-01 NOTE — TELEPHONE ENCOUNTER
----- Message from Thais Jane sent at 12/1/2020 11:28 AM CST -----  Contact: Miracle (daughter) @ [phone]  Pts daughter is calling for the status of pts refill req for carbidopa-levodopa  mg (SINEMET CR)  mg TbSRl.  Pt is out of medication.     [facility] [address]   [phone] (Phone)         [phone] (Fax)

## 2020-12-02 RX ORDER — CARBIDOPA AND LEVODOPA 50; 200 MG/1; MG/1
1 TABLET, EXTENDED RELEASE ORAL 2 TIMES DAILY
Qty: 60 TABLET | Refills: 1 | Status: SHIPPED | OUTPATIENT
Start: 2020-12-02 | End: 2021-06-24

## 2021-05-06 ENCOUNTER — OFFICE VISIT (OUTPATIENT)
Dept: NEUROSURGERY | Facility: CLINIC | Age: 71
End: 2021-05-06
Payer: MEDICARE

## 2021-05-06 ENCOUNTER — TELEPHONE (OUTPATIENT)
Dept: NEUROSURGERY | Facility: CLINIC | Age: 71
End: 2021-05-06

## 2021-05-06 DIAGNOSIS — G20.A1 PARKINSON'S DISEASE: Primary | ICD-10-CM

## 2021-05-06 DIAGNOSIS — G47.52 REM BEHAVIORAL DISORDER: ICD-10-CM

## 2021-05-06 PROCEDURE — 1159F MED LIST DOCD IN RCRD: CPT | Mod: S$GLB,,, | Performed by: NEUROLOGICAL SURGERY

## 2021-05-06 PROCEDURE — 99205 OFFICE O/P NEW HI 60 MIN: CPT | Mod: S$GLB,,, | Performed by: NEUROLOGICAL SURGERY

## 2021-05-06 PROCEDURE — 1159F PR MEDICATION LIST DOCUMENTED IN MEDICAL RECORD: ICD-10-PCS | Mod: S$GLB,,, | Performed by: NEUROLOGICAL SURGERY

## 2021-05-06 PROCEDURE — 99999 PR PBB SHADOW E&M-EST. PATIENT-LVL II: ICD-10-PCS | Mod: PBBFAC,,, | Performed by: NEUROLOGICAL SURGERY

## 2021-05-06 PROCEDURE — 99205 PR OFFICE/OUTPT VISIT, NEW, LEVL V, 60-74 MIN: ICD-10-PCS | Mod: S$GLB,,, | Performed by: NEUROLOGICAL SURGERY

## 2021-05-06 PROCEDURE — 99999 PR PBB SHADOW E&M-EST. PATIENT-LVL II: CPT | Mod: PBBFAC,,, | Performed by: NEUROLOGICAL SURGERY

## 2021-05-14 PROBLEM — G47.52 REM BEHAVIORAL DISORDER: Status: ACTIVE | Noted: 2021-05-14

## 2021-06-03 ENCOUNTER — TELEPHONE (OUTPATIENT)
Dept: NEUROLOGY | Facility: CLINIC | Age: 71
End: 2021-06-03

## 2021-06-24 ENCOUNTER — OFFICE VISIT (OUTPATIENT)
Dept: NEUROLOGY | Facility: CLINIC | Age: 71
End: 2021-06-24
Payer: MEDICARE

## 2021-06-24 VITALS
DIASTOLIC BLOOD PRESSURE: 84 MMHG | WEIGHT: 202.19 LBS | BODY MASS INDEX: 31.66 KG/M2 | SYSTOLIC BLOOD PRESSURE: 143 MMHG | HEART RATE: 78 BPM

## 2021-06-24 DIAGNOSIS — G20.A1 PARKINSON DISEASE: ICD-10-CM

## 2021-06-24 DIAGNOSIS — G20.A1 PARKINSON'S DISEASE: ICD-10-CM

## 2021-06-24 PROCEDURE — 3008F BODY MASS INDEX DOCD: CPT | Mod: S$GLB,,, | Performed by: PSYCHIATRY & NEUROLOGY

## 2021-06-24 PROCEDURE — 1101F PT FALLS ASSESS-DOCD LE1/YR: CPT | Mod: S$GLB,,, | Performed by: PSYCHIATRY & NEUROLOGY

## 2021-06-24 PROCEDURE — 3008F PR BODY MASS INDEX (BMI) DOCUMENTED: ICD-10-PCS | Mod: S$GLB,,, | Performed by: PSYCHIATRY & NEUROLOGY

## 2021-06-24 PROCEDURE — 1126F PR PAIN SEVERITY QUANTIFIED, NO PAIN PRESENT: ICD-10-PCS | Mod: S$GLB,,, | Performed by: PSYCHIATRY & NEUROLOGY

## 2021-06-24 PROCEDURE — 1101F PR PT FALLS ASSESS DOC 0-1 FALLS W/OUT INJ PAST YR: ICD-10-PCS | Mod: S$GLB,,, | Performed by: PSYCHIATRY & NEUROLOGY

## 2021-06-24 PROCEDURE — 1126F AMNT PAIN NOTED NONE PRSNT: CPT | Mod: S$GLB,,, | Performed by: PSYCHIATRY & NEUROLOGY

## 2021-06-24 PROCEDURE — 3288F FALL RISK ASSESSMENT DOCD: CPT | Mod: S$GLB,,, | Performed by: PSYCHIATRY & NEUROLOGY

## 2021-06-24 PROCEDURE — 3288F PR FALLS RISK ASSESSMENT DOCUMENTED: ICD-10-PCS | Mod: S$GLB,,, | Performed by: PSYCHIATRY & NEUROLOGY

## 2021-06-24 PROCEDURE — 1159F MED LIST DOCD IN RCRD: CPT | Mod: S$GLB,,, | Performed by: PSYCHIATRY & NEUROLOGY

## 2021-06-24 PROCEDURE — 99214 PR OFFICE/OUTPT VISIT, EST, LEVL IV, 30-39 MIN: ICD-10-PCS | Mod: S$GLB,,, | Performed by: PSYCHIATRY & NEUROLOGY

## 2021-06-24 PROCEDURE — 99999 PR PBB SHADOW E&M-EST. PATIENT-LVL III: ICD-10-PCS | Mod: PBBFAC,,, | Performed by: PSYCHIATRY & NEUROLOGY

## 2021-06-24 PROCEDURE — 1159F PR MEDICATION LIST DOCUMENTED IN MEDICAL RECORD: ICD-10-PCS | Mod: S$GLB,,, | Performed by: PSYCHIATRY & NEUROLOGY

## 2021-06-24 PROCEDURE — 99999 PR PBB SHADOW E&M-EST. PATIENT-LVL III: CPT | Mod: PBBFAC,,, | Performed by: PSYCHIATRY & NEUROLOGY

## 2021-06-24 PROCEDURE — 99214 OFFICE O/P EST MOD 30 MIN: CPT | Mod: S$GLB,,, | Performed by: PSYCHIATRY & NEUROLOGY

## 2021-06-24 RX ORDER — SELEGILINE HYDROCHLORIDE 5 MG/1
5 TABLET ORAL 2 TIMES DAILY WITH MEALS
Qty: 180 TABLET | Refills: 12 | Status: SHIPPED | OUTPATIENT
Start: 2021-06-24 | End: 2021-09-08 | Stop reason: SDUPTHER

## 2021-06-24 RX ORDER — CARBIDOPA AND LEVODOPA 50; 200 MG/1; MG/1
1 TABLET, EXTENDED RELEASE ORAL 3 TIMES DAILY
Qty: 60 TABLET | Refills: 12 | Status: SHIPPED | OUTPATIENT
Start: 2021-06-24 | End: 2021-08-12 | Stop reason: SDUPTHER

## 2021-08-11 ENCOUNTER — PATIENT MESSAGE (OUTPATIENT)
Dept: NEUROLOGY | Facility: CLINIC | Age: 71
End: 2021-08-11

## 2021-08-11 DIAGNOSIS — G20.A1 PARKINSON'S DISEASE: ICD-10-CM

## 2021-08-12 RX ORDER — CARBIDOPA AND LEVODOPA 50; 200 MG/1; MG/1
1 TABLET, EXTENDED RELEASE ORAL 3 TIMES DAILY
Qty: 270 TABLET | Refills: 3 | Status: SHIPPED | OUTPATIENT
Start: 2021-08-12 | End: 2022-12-07

## 2021-09-03 ENCOUNTER — PATIENT MESSAGE (OUTPATIENT)
Dept: NEUROLOGY | Facility: CLINIC | Age: 71
End: 2021-09-03

## 2021-09-03 ENCOUNTER — PATIENT MESSAGE (OUTPATIENT)
Dept: NEUROSURGERY | Facility: CLINIC | Age: 71
End: 2021-09-03

## 2021-09-03 DIAGNOSIS — G20.A1 PARKINSON DISEASE: ICD-10-CM

## 2021-09-08 RX ORDER — SELEGILINE HYDROCHLORIDE 5 MG/1
5 TABLET ORAL 2 TIMES DAILY WITH MEALS
Qty: 180 TABLET | Refills: 12 | Status: SHIPPED | OUTPATIENT
Start: 2021-09-08 | End: 2022-09-07

## 2021-09-09 ENCOUNTER — TELEPHONE (OUTPATIENT)
Dept: NEUROLOGY | Facility: CLINIC | Age: 71
End: 2021-09-09

## 2021-09-09 ENCOUNTER — PATIENT MESSAGE (OUTPATIENT)
Dept: NEUROLOGY | Facility: CLINIC | Age: 71
End: 2021-09-09

## 2022-01-05 ENCOUNTER — PATIENT MESSAGE (OUTPATIENT)
Dept: NEUROLOGY | Facility: CLINIC | Age: 72
End: 2022-01-05
Payer: MEDICARE

## 2022-02-21 ENCOUNTER — PATIENT MESSAGE (OUTPATIENT)
Dept: NEUROLOGY | Facility: CLINIC | Age: 72
End: 2022-02-21
Payer: MEDICARE

## 2022-04-07 ENCOUNTER — HISTORICAL (OUTPATIENT)
Dept: ADMINISTRATIVE | Facility: HOSPITAL | Age: 72
End: 2022-04-07
Payer: MEDICARE

## 2022-04-24 VITALS
BODY MASS INDEX: 32.49 KG/M2 | HEIGHT: 67 IN | SYSTOLIC BLOOD PRESSURE: 160 MMHG | WEIGHT: 207 LBS | DIASTOLIC BLOOD PRESSURE: 85 MMHG

## 2022-04-28 NOTE — OP NOTE
Patient:   Luiz Mar             MRN: [id number]            FIN: [id number]               Age:   68 years     Sex:  Male     :  1950   Associated Diagnoses:   Incisional hernia, incarcerated   Author:   Constance Mcmahon MD      Operative Note   Operative Information   Date/ Time:  2019 11:05:00.     Procedures Performed: Procedure Code   Laparoscopy, surgical, repair, ventral, umbilical, spigelian or epigastric hernia (includes mesh insertion, when performed); incarcerated or strangulated (31671)..     Indications: 68-year-old white male with incarcerated incisional ventral wall hernia electing to undergo laparoscopic ventral hernia repair.     Preoperative Diagnosis: Incisional hernia, incarcerated (MEL68-SA K43.0).     Postoperative Diagnosis: Incisional hernia, incarcerated ([id number]-JC K43.0).     Surgeon: Constance Mcmahon MD.     Anesthesia: General.     Speciman Removed: Preperitoneal fat and hernia sac.     Description of Procedure/Findings/    Complications: Patient brought to the operating theater laid in the supine position general endotracheal intubation was performed a left bump was placed.  There the abdomen and chest were sterilely prepped and draped in normal surgical fashion.  Using Veress needle technique Veress needle was inserted palmers point in the left upper quadrant of the first pass and the abdomen was insufflated to 15 mmHg with CO2 gas.  The point a Visiport was placed into the right hypogastrium without difficulty.  The left abdominal wall was then examined and appropriate insertion point for robotic trochars were chosen.  The skin and subcutaneous tissues overlying each insertion point were infiltrated 1% lidocaine and epinephrine.  A #11 blade was used to incise the skin in the trochars were inserted under direct visualization into the left quadrant.  Two 8 mm operative trochars and one 8 mm optical trocar.  The robot was then pulled over the patient's right side and  the camera and instruments were introduced into the abdomen under direct visualization.  At this point I took over control of the robotic tower leaving the assistance at the bedside.  Properly marked spots within the peritoneum were chosen and dissection was carried around the ventral incisional hernia site.  A portion of omentum was incarcerated within the hernia sac.  It was dissected free after adhesions were broken up.   The portion of omentum appeared to be healthy and was left in place within the abdomen.  The defect measured approximately 3 x 2 cm in size.  A large peritoneal flap was created about encompassing the umbilical defect.  The V-lock suture was then chosen to primarily close the umbilical defect and tacked the umbilical stump into appropriate position.  A composite mesh was then chosen to size measuring approximately 9 cm in diameter and sutured onto the abdominal wall.   Segment of preperitoneal fat and hernia sac were then placed within an Endo Catch bag and retrieved from the right subcostal port site.  The  trochars then removed under direct visualization and the abdomen was relieved of insufflation.  The trocar insertion sites were closed with  4-0 subcuticular Monocryl and a sterile dressing was placed upon the skin.  The patient was relieved anesthesia in a stable condition and transferred to the postanesthesia care unit.  .     Esimated blood loss: loss  5  cc.     Findings: Incarcerated incisional ventral hernia.     Complications: None.

## 2022-04-28 NOTE — CONSULTS
DATE OF CONSULTATION:      CONSULTING PHYSICIAN:  Neftaly Miller M.D.    HISTORY:  This is a 68-year-old white male who was in the operating room under anesthesia in preparation for an umbilical hernia repair.  They were unable to insert a Damon and I was consulted for evaluation.  History is obtained from the chart.  Patient had apparently previously denied any voiding complaints.  He attempted to have a Damon catheter placed and they could not get the Damon in and he was having gross blood from the urethral meatus.    PAST SURGICAL HISTORY:  Significant for an appendectomy, colonoscopy, a right inguinal hernia repair, and tonsillectomy.    MEDICATIONS:  At home consist of olmesartan and __________.    PAST MEDICAL HISTORY:  He has a history of Parkinson disease, a renal cyst and a tremor.    ALLERGIES:  HE HAS NO KNOWN DRUG ALLERGIES.      SOCIAL HISTORY:  Denies any alcohol abuse.  No tobacco.  He is .    FAMILY HISTORY:  His father had coronary artery disease, mother had cancer, and he had a brother with prostate cancer.    REVIEW OF SYSTEMS:  The patient denied any recent weight loss or fever.  He denied any headaches or diplopia.  No hearing problems.  No nosebleeds.  Denied any dysphagia.  Denied any shortness of breath or cough.  No chest pain or palpitations.  Bowel movements were okay.  He denied any skin lesions.  Denied any neurological problems other than some tremors and Parkinson disease.  Denied any musculoskeletal pains or joint abnormalities.  Denied any skin lesions.  Denied any bleeding abnormalities.    PHYSICAL EXAMINATION:  GENERAL:  Noted the patient to be asleep on the table.     HEENT:  He is normocephalic.  Eyes, ears, nose and throat were grossly clear.   CHEST:  Clear to auscultation and percussion.   CARDIOVASCULAR:  Reveals a regular rate and rhythm.   ABDOMEN:  Demonstrated an umbilical hernia.   :  Examination noted penis and testicles to be within normal limits.      RECTAL:  Examination revealed an enlarged benign-feeling prostate.   EXTREMITIES:  Demonstrate a full range of motion.     NEUROLOGICAL:  Not assessed secondary to being under anesthesia.     SKIN:  Without lesions.    LABORATORY DATA:  Revealed a creatinine of 1.08, glucose slightly elevated at 130.  Electrolytes were within normal limits.  White blood cell count was 7.8, hematocrit 44.1.  His preop urinalysis was unremarkable.    ASSESSMENT:  Urethral false passage and BPH in a patient with an umbilical hernia and renal cyst.  We attempted to use filiforms and followers to pass the catheter, however, this was unsuccessful.  Finally took the flexible cystoscope and advanced this per urethra through the false passage and the bulbar urethra.  The scope was able be manipulated into the bladder.  He had an enlarged obstructing prostate and bladder.  Visualization difficult secondary to some bleeding.  However, no obvious mucosal lesions were identified.  His ureteral orifices appeared normal.  At this time, a guidewire was guided through the scope and into the bladder.  The scope was then removed.  The tip of an 18-Sammarinese coude catheter was cut off and the guidewire was guided through the catheter and the catheter was guided into the bladder.  We then had irrigation of blood clots from his bladder and connect to gravity drainage.  Rectal examination again performed, which revealed no evidence of any injury to the rectum and the catheter was in good position.  This time it was connected to gravity drainage and the patient returned to his room in good condition.       Recommendation would be to place him on Cipro 500 mg 1 p.o. b.i.d. for 5 days and remove the Damon catheter, and I will schedule him to follow up in the office in 2 weeks.  In a week we will get a flow rate and a bladder scan.        NIKO/LEVI   DD: 06/25/2019 0812   DT: 06/25/2019 0841  Job # [id number]    cc: Constance Mcmahon M.D.

## 2022-04-28 NOTE — OP NOTE
PRE-PROCEDURE DIAGNOSES:  Urethral false passage, unable to insert Damon catheter.    POSTOP DIAGNOSES:  Urethral false passage, unable to insert Damon catheter.    PROCEDURE:  Flexible cystoscopy and difficult insertion of a Damon and bladder irrigation.    DESCRIPTION OF PROCEDURE:  The patient was already in the operating room under anesthesia, when they could not pass the Damon catheter, and had gross blood.  We were unable to use filiforms and followers to get a catheter in, so we performed flexible cystoscopy, and this revealed a tear in the posterior urethra in the bulbar urethra.  The prostate was obstructing, and the bladder demonstrated smooth walls.  He had some blood clots in the bladder.  Visualization was difficult, but no obvious lesions were ever seen.  The ureteral orifice was normal size, shape, and position.  At this time, a guidewire was guided through the flexible cystoscope and into the bladder.  The flexible cystoscope was removed.  The tip of the coude catheter was then cut off, and this was able be guided over the guidewire with some difficulty and into the bladder.  The guidewire was then removed.  We attempted to irrigate this, however, we had blood clots, so we persisted with irrigation until we got all of the blood clots out, and his urine was clear.  He was then connected to gravity drainage.  A rectal examination was then performed, which revealed no evidence of any injury to the rectum, and the Damon catheter was in the bladder.  At this time, the patient turned over to Dr. Mcmahno, and we will follow up in the office in a week with a flow rate and a bladder scan and a UA.  He can remove the Damon after 5 days.  He will be placed on some Cipro 500 mg 1 p.o. b.i.d. for 5 days prior to this, and he can remove the Damon on the fifth day.        NIKO/MODL   DD: 06/25/2019 0816   DT: 06/25/2019 0827  Job # [id number]    cc: Constance Mcmahon M.D.

## 2022-05-05 ENCOUNTER — PATIENT MESSAGE (OUTPATIENT)
Dept: RESEARCH | Facility: HOSPITAL | Age: 72
End: 2022-05-05
Payer: MEDICARE

## 2023-06-26 DIAGNOSIS — G20.A1 PARKINSON'S DISEASE: ICD-10-CM

## 2023-06-26 RX ORDER — CARBIDOPA AND LEVODOPA 50; 200 MG/1; MG/1
TABLET, EXTENDED RELEASE ORAL
Qty: 270 TABLET | Refills: 0 | Status: SHIPPED | OUTPATIENT
Start: 2023-06-26

## 2023-09-21 ENCOUNTER — TELEPHONE (OUTPATIENT)
Dept: NEUROLOGY | Facility: CLINIC | Age: 73
End: 2023-09-21
Payer: MEDICARE

## 2023-09-21 NOTE — TELEPHONE ENCOUNTER
----- Message from Jessica Chatman sent at 9/21/2023 11:15 AM CDT -----  Regarding: Patient Advice  Contact: Erin [phone]  Erin/ wife is calling to speak to nurse about prescription please call

## 2023-09-21 NOTE — TELEPHONE ENCOUNTER
----- Message from Melvina Gonzáles sent at 9/21/2023  2:31 PM CDT -----  Regarding: mediction info  Contact: wife [phone]  PATIENTCALL     Pt wife is calling to speak with someone in provider office regarding she want to get the information on all the medication that was prescribed to him while in care of Neuro she is asking for a return call please call pt at  [phone] if possible she would like to have the info emailed

## 2023-09-22 ENCOUNTER — TELEPHONE (OUTPATIENT)
Dept: NEUROLOGY | Facility: CLINIC | Age: 73
End: 2023-09-22
Payer: MEDICARE

## 2023-09-22 NOTE — TELEPHONE ENCOUNTER
----- Message from Yoana tSanley sent at 9/22/2023  3:47 PM CDT -----  Regarding: Pt Advice  Contact: [phone]  CONSULT/ADVISORY    Name of Caller:  Erin (Spouse)    Contact Preference: [phone]    Nature of Call: Pts spouse is calling to find out which medications Dr Tina Jaramillo prescribed for her  back in 2016.  States she's concerned.  Pt would like to start taking Rytary but insurance won't cover.  Please call.

## 2025-05-27 DIAGNOSIS — G20.A1 PARKINSON DISEASE: Primary | ICD-10-CM
